# Patient Record
Sex: FEMALE | Race: WHITE | Employment: UNEMPLOYED | URBAN - NONMETROPOLITAN AREA
[De-identification: names, ages, dates, MRNs, and addresses within clinical notes are randomized per-mention and may not be internally consistent; named-entity substitution may affect disease eponyms.]

---

## 2020-03-24 ENCOUNTER — HOSPITAL ENCOUNTER (OUTPATIENT)
Age: 65
Setting detail: SPECIMEN
Discharge: HOME | End: 2020-03-24
Payer: COMMERCIAL

## 2020-03-24 LAB
ANION GAP SERPL CALCULATED.3IONS-SCNC: 14 MMOL/L (ref 9–17)
BUN BLDV-MCNC: 18 MG/DL (ref 8–23)
BUN/CREAT BLD: 56 (ref 9–20)
CALCIUM SERPL-MCNC: 9.6 MG/DL (ref 8.6–10.4)
CHLORIDE BLD-SCNC: 95 MMOL/L (ref 98–107)
CO2: 26 MMOL/L (ref 20–31)
CREAT SERPL-MCNC: 0.32 MG/DL (ref 0.5–0.9)
GFR AFRICAN AMERICAN: >60 ML/MIN
GFR NON-AFRICAN AMERICAN: >60 ML/MIN
GFR SERPL CREATININE-BSD FRML MDRD: ABNORMAL ML/MIN/{1.73_M2}
GFR SERPL CREATININE-BSD FRML MDRD: ABNORMAL ML/MIN/{1.73_M2}
GLUCOSE BLD-MCNC: 84 MG/DL (ref 70–99)
HCT VFR BLD CALC: 35.8 % (ref 36.3–47.1)
HEMOGLOBIN: 10.4 G/DL (ref 11.9–15.1)
MCH RBC QN AUTO: 25.3 PG (ref 25.2–33.5)
MCHC RBC AUTO-ENTMCNC: 29.1 G/DL (ref 28.4–34.8)
MCV RBC AUTO: 87.1 FL (ref 82.6–102.9)
NRBC AUTOMATED: 0 PER 100 WBC
PDW BLD-RTO: 14.4 % (ref 11.8–14.4)
PLATELET # BLD: 358 K/UL (ref 138–453)
PMV BLD AUTO: 9.5 FL (ref 8.1–13.5)
POTASSIUM SERPL-SCNC: 4.1 MMOL/L (ref 3.7–5.3)
RBC # BLD: 4.11 M/UL (ref 3.95–5.11)
SODIUM BLD-SCNC: 135 MMOL/L (ref 135–144)
WBC # BLD: 9.1 K/UL (ref 3.5–11.3)

## 2020-03-24 PROCEDURE — P9604 ONE-WAY ALLOW PRORATED TRIP: HCPCS

## 2020-03-24 PROCEDURE — 85027 COMPLETE CBC AUTOMATED: CPT

## 2020-03-24 PROCEDURE — 36415 COLL VENOUS BLD VENIPUNCTURE: CPT

## 2020-03-24 PROCEDURE — 80048 BASIC METABOLIC PNL TOTAL CA: CPT

## 2023-03-03 LAB
NON-UH HIE A/G RATIO: 1.2
NON-UH HIE ALB: 4 G/DL (ref 3.4–5)
NON-UH HIE ALK PHOS: 62 UNIT/L (ref 46–116)
NON-UH HIE BILIRUBIN, TOTAL: 0.7 MG/DL (ref 0.2–1)
NON-UH HIE BUN/CREAT RATIO: 30
NON-UH HIE BUN: 24 MG/DL (ref 9–23)
NON-UH HIE CALCIUM: 9.5 MG/DL (ref 8.7–10.4)
NON-UH HIE CALCULATED LDL CHOLESTEROL: 120 MG/DL (ref 60–130)
NON-UH HIE CALCULATED OSMOLALITY: 285 MOSM/KG (ref 275–295)
NON-UH HIE CHLORIDE: 104 MMOL/L (ref 98–107)
NON-UH HIE CHOLESTEROL: 197 MG/DL (ref 100–200)
NON-UH HIE CO2, VENOUS: 30 MMOL/L (ref 20–31)
NON-UH HIE CREATININE: 0.8 MG/DL (ref 0.5–0.8)
NON-UH HIE GFR AA: >60
NON-UH HIE GLOBULIN: 3.4 G/DL
NON-UH HIE GLOMERULAR FILTRATION RATE: >60 ML/MIN/1.73M?
NON-UH HIE GLUCOSE: 95 MG/DL (ref 74–106)
NON-UH HIE GOT: 19 UNIT/L (ref 15–37)
NON-UH HIE GPT: 28 UNIT/L (ref 10–49)
NON-UH HIE HDL CHOLESTEROL: 52 MG/DL (ref 40–60)
NON-UH HIE HGB A1C: 5.2 %
NON-UH HIE K: 4 MMOL/L (ref 3.5–5.1)
NON-UH HIE NA: 141 MMOL/L (ref 135–145)
NON-UH HIE TOTAL CHOL/HDL CHOL RATIO: 3.8
NON-UH HIE TOTAL PROTEIN: 7.4 G/DL (ref 5.7–8.2)
NON-UH HIE TRIGLYCERIDES: 126 MG/DL (ref 30–150)

## 2023-03-10 ENCOUNTER — TELEPHONE (OUTPATIENT)
Dept: PRIMARY CARE | Facility: CLINIC | Age: 68
End: 2023-03-10
Payer: COMMERCIAL

## 2023-03-10 DIAGNOSIS — Z12.31 ENCOUNTER FOR SCREENING MAMMOGRAM FOR BREAST CANCER: Primary | ICD-10-CM

## 2023-03-16 ENCOUNTER — TELEPHONE (OUTPATIENT)
Dept: PRIMARY CARE | Facility: CLINIC | Age: 68
End: 2023-03-16

## 2023-07-12 ENCOUNTER — APPOINTMENT (OUTPATIENT)
Dept: PRIMARY CARE | Facility: CLINIC | Age: 68
End: 2023-07-12
Payer: COMMERCIAL

## 2023-09-05 ENCOUNTER — APPOINTMENT (OUTPATIENT)
Dept: PRIMARY CARE | Facility: CLINIC | Age: 68
End: 2023-09-05
Payer: MEDICARE

## 2023-09-18 ENCOUNTER — OFFICE VISIT (OUTPATIENT)
Dept: PRIMARY CARE | Facility: CLINIC | Age: 68
End: 2023-09-18
Payer: MEDICARE

## 2023-09-18 VITALS
WEIGHT: 159 LBS | HEIGHT: 65 IN | BODY MASS INDEX: 26.49 KG/M2 | SYSTOLIC BLOOD PRESSURE: 124 MMHG | DIASTOLIC BLOOD PRESSURE: 78 MMHG | HEART RATE: 92 BPM | TEMPERATURE: 97.5 F

## 2023-09-18 DIAGNOSIS — Z85.820 PERSONAL HISTORY OF MALIGNANT MELANOMA OF SKIN: ICD-10-CM

## 2023-09-18 DIAGNOSIS — G62.9 NEUROPATHY: ICD-10-CM

## 2023-09-18 DIAGNOSIS — I10 BENIGN ESSENTIAL HYPERTENSION: ICD-10-CM

## 2023-09-18 DIAGNOSIS — Z78.0 POSTMENOPAUSAL STATE: ICD-10-CM

## 2023-09-18 DIAGNOSIS — Z12.31 ENCOUNTER FOR SCREENING MAMMOGRAM FOR BREAST CANCER: ICD-10-CM

## 2023-09-18 DIAGNOSIS — Z11.59 NEED FOR HEPATITIS C SCREENING TEST: ICD-10-CM

## 2023-09-18 DIAGNOSIS — R73.03 PREDIABETES: ICD-10-CM

## 2023-09-18 DIAGNOSIS — Z00.00 MEDICARE ANNUAL WELLNESS VISIT, SUBSEQUENT: Primary | ICD-10-CM

## 2023-09-18 DIAGNOSIS — C43.71 MALIGNANT MELANOMA OF RIGHT LOWER LEG (MULTI): ICD-10-CM

## 2023-09-18 DIAGNOSIS — E78.01 FAMILIAL HYPERCHOLESTEROLEMIA: ICD-10-CM

## 2023-09-18 PROBLEM — D18.01 HEMANGIOMA OF SKIN AND SUBCUTANEOUS TISSUE: Status: ACTIVE | Noted: 2023-06-28

## 2023-09-18 PROBLEM — D22.4 MELANOCYTIC NEVI OF SCALP AND NECK: Status: ACTIVE | Noted: 2023-06-28

## 2023-09-18 PROBLEM — L28.0 LICHEN SIMPLEX CHRONICUS: Status: ACTIVE | Noted: 2023-06-28

## 2023-09-18 PROBLEM — J30.9 ALLERGIC RHINITIS: Status: ACTIVE | Noted: 2023-09-18

## 2023-09-18 PROBLEM — E53.8 VITAMIN B12 DEFICIENCY: Status: ACTIVE | Noted: 2023-09-18

## 2023-09-18 PROBLEM — D22.5 MELANOCYTIC NEVI OF TRUNK: Status: ACTIVE | Noted: 2023-06-28

## 2023-09-18 PROBLEM — M54.50 LUMBAR BACK PAIN: Status: ACTIVE | Noted: 2023-09-18

## 2023-09-18 PROBLEM — G57.91 NEUROPATHY OF RIGHT LOWER EXTREMITY: Status: ACTIVE | Noted: 2023-09-18

## 2023-09-18 PROBLEM — D64.9 ANEMIA: Status: ACTIVE | Noted: 2023-09-18

## 2023-09-18 PROBLEM — E78.5 HYPERLIPIDEMIA: Status: ACTIVE | Noted: 2023-09-18

## 2023-09-18 PROBLEM — M99.03 SEGMENTAL AND SOMATIC DYSFUNCTION OF LUMBAR REGION: Status: ACTIVE | Noted: 2023-09-18

## 2023-09-18 PROBLEM — C44.519 BASAL CELL CARCINOMA OF SKIN OF OTHER PART OF TRUNK: Status: ACTIVE | Noted: 2023-06-28

## 2023-09-18 PROBLEM — L57.0 ACTINIC KERATOSIS: Status: ACTIVE | Noted: 2023-06-28

## 2023-09-18 PROBLEM — K76.0 FATTY LIVER: Status: ACTIVE | Noted: 2023-09-18

## 2023-09-18 PROBLEM — E55.9 VITAMIN D DEFICIENCY: Status: ACTIVE | Noted: 2023-09-18

## 2023-09-18 PROBLEM — L91.0 KELOID: Status: ACTIVE | Noted: 2023-06-28

## 2023-09-18 PROBLEM — U07.1 COVID-19: Status: RESOLVED | Noted: 2023-09-18 | Resolved: 2023-09-18

## 2023-09-18 PROBLEM — D22.62 MELANOCYTIC NEVI OF LEFT UPPER LIMB, INCLUDING SHOULDER: Status: ACTIVE | Noted: 2023-06-28

## 2023-09-18 PROBLEM — M54.12 CERVICAL RADICULOPATHY, ACUTE: Status: ACTIVE | Noted: 2023-09-18

## 2023-09-18 PROBLEM — R92.8 ABNORMAL MAMMOGRAM: Status: ACTIVE | Noted: 2023-09-18

## 2023-09-18 PROBLEM — R07.9 CHEST PAIN: Status: ACTIVE | Noted: 2023-09-18

## 2023-09-18 PROCEDURE — 3078F DIAST BP <80 MM HG: CPT | Performed by: FAMILY MEDICINE

## 2023-09-18 PROCEDURE — 1160F RVW MEDS BY RX/DR IN RCRD: CPT | Performed by: FAMILY MEDICINE

## 2023-09-18 PROCEDURE — 99213 OFFICE O/P EST LOW 20 MIN: CPT | Performed by: FAMILY MEDICINE

## 2023-09-18 PROCEDURE — 1170F FXNL STATUS ASSESSED: CPT | Performed by: FAMILY MEDICINE

## 2023-09-18 PROCEDURE — 1126F AMNT PAIN NOTED NONE PRSNT: CPT | Performed by: FAMILY MEDICINE

## 2023-09-18 PROCEDURE — 1036F TOBACCO NON-USER: CPT | Performed by: FAMILY MEDICINE

## 2023-09-18 PROCEDURE — 1159F MED LIST DOCD IN RCRD: CPT | Performed by: FAMILY MEDICINE

## 2023-09-18 PROCEDURE — G0439 PPPS, SUBSEQ VISIT: HCPCS | Performed by: FAMILY MEDICINE

## 2023-09-18 PROCEDURE — 3074F SYST BP LT 130 MM HG: CPT | Performed by: FAMILY MEDICINE

## 2023-09-18 RX ORDER — ATORVASTATIN CALCIUM 40 MG/1
40 TABLET, FILM COATED ORAL DAILY
COMMUNITY
Start: 2023-06-17 | End: 2024-03-19 | Stop reason: SDUPTHER

## 2023-09-18 RX ORDER — AMLODIPINE BESYLATE 5 MG/1
5 TABLET ORAL DAILY
COMMUNITY
End: 2024-03-19 | Stop reason: SDUPTHER

## 2023-09-18 RX ORDER — HYDROCHLOROTHIAZIDE 50 MG/1
50 TABLET ORAL DAILY
COMMUNITY
End: 2024-03-19 | Stop reason: SDUPTHER

## 2023-09-18 RX ORDER — POTASSIUM CHLORIDE 750 MG/1
CAPSULE, EXTENDED RELEASE ORAL EVERY 12 HOURS
COMMUNITY
End: 2024-03-19 | Stop reason: SDUPTHER

## 2023-09-18 ASSESSMENT — ACTIVITIES OF DAILY LIVING (ADL)
TAKING_MEDICATION: INDEPENDENT
DRESSING: INDEPENDENT
GROCERY_SHOPPING: INDEPENDENT
DOING_HOUSEWORK: INDEPENDENT
MANAGING_FINANCES: INDEPENDENT
BATHING: INDEPENDENT

## 2023-09-18 ASSESSMENT — PATIENT HEALTH QUESTIONNAIRE - PHQ9
1. LITTLE INTEREST OR PLEASURE IN DOING THINGS: NOT AT ALL
1. LITTLE INTEREST OR PLEASURE IN DOING THINGS: NOT AT ALL
SUM OF ALL RESPONSES TO PHQ9 QUESTIONS 1 AND 2: 0
2. FEELING DOWN, DEPRESSED OR HOPELESS: NOT AT ALL
2. FEELING DOWN, DEPRESSED OR HOPELESS: NOT AT ALL
SUM OF ALL RESPONSES TO PHQ9 QUESTIONS 1 AND 2: 0

## 2023-09-18 NOTE — PROGRESS NOTES
Subjective   Reason for Visit: Zakiya Barrow is an 68 y.o. female here for a Medicare Wellness visit.     Past Medical, Surgical, and Family History reviewed and updated in chart.    Reviewed all medications by prescribing practitioner or clinical pharmacist (such as prescriptions, OTCs, herbal therapies and supplements) and documented in the medical record.    Very pleasant 68-year-old here today for follow-up chronic conditions  Hypertension and hyperlipidemia  Had been exercising regularly very active and social  Also here for annual Medicare wellness exam  No falls or accidents patient is very functional  Tolerating meds well with no problems side effects or issues has had these chronic conditions for quite some time and they have been well controlled she is done extremely well with lifestyle modification  Has history of malignant melanoma had resection and treatment has been having some peripheral neuropathy since the treatment and that seems to be getting a bit worse and starting to impair her otherwise healthy wife no injury to her back has some persistent paresthesias and weakness in the leg no incontinence no falls no foot drop  Has been persistent for most of the last year    Hyperlipidemia  This is a chronic problem. The current episode started more than 1 year ago. The problem is controlled. She has no history of chronic renal disease, diabetes, hypothyroidism, liver disease, obesity or nephrotic syndrome. There are no known factors aggravating her hyperlipidemia. Pertinent negatives include no chest pain, focal sensory loss, focal weakness, leg pain, myalgias or shortness of breath. Current antihyperlipidemic treatment includes exercise, diet change and statins. The current treatment provides significant improvement of lipids. There are no compliance problems.  Risk factors for coronary artery disease include dyslipidemia, family history, hypertension and post-menopausal.   Hypertension  This is a  chronic problem. The current episode started more than 1 year ago. The problem is controlled. Pertinent negatives include no anxiety, blurred vision, chest pain, headaches, malaise/fatigue, neck pain, orthopnea, palpitations, peripheral edema, PND, shortness of breath or sweats. There are no associated agents to hypertension. Risk factors for coronary artery disease include family history, dyslipidemia and post-menopausal state. Past treatments include diuretics and calcium channel blockers. The current treatment provides significant improvement. There are no compliance problems.  There is no history of angina, kidney disease, CAD/MI, CVA, heart failure, left ventricular hypertrophy, PVD or retinopathy. There is no history of chronic renal disease, coarctation of the aorta, hyperaldosteronism, hypercortisolism, hyperparathyroidism, a hypertension causing med, pheochromocytoma, renovascular disease, sleep apnea or a thyroid problem.       Patient Self Assessment of Health Status  Patient Self Assessment: Good    Nutrition and Exercise  Current Diet: Well Balanced Diet  Adequate Fluid Intake: Yes  Caffeine: No  Exercise Frequency: Regularly    Functional Ability/Level of Safety  Cognitive Impairment Observed: No cognitive impairment observed  Cognitive Impairment Reported: No cognitive impairment reported by patient or family    Home Safety Risk Factors: None         Patient Care Team:  Miryam Lozoya MD as PCP - General  Miryam Lozoya MD as PCP - [facility]     Review of Systems   Constitutional:  Negative for malaise/fatigue.   Eyes:  Negative for blurred vision.   Respiratory:  Negative for shortness of breath.    Cardiovascular:  Negative for chest pain, palpitations, orthopnea and PND.   Musculoskeletal:  Negative for myalgias and neck pain.   Neurological:  Negative for focal weakness and headaches.     Constitutional: no chills, no fever and no night sweats.   Eyes: no blurred vision and  "no eyesight problems.   ENT: no hearing loss, no nasal congestion, no nasal discharge, no hoarseness and no sore throat.   Cardiovascular: no chest pain, no intermittent leg claudication, no lower extremity edema, no palpitations and no syncope.   Respiratory: no cough, no shortness of breath during exertion, no shortness of breath at rest and no wheezing.   Gastrointestinal: no abdominal pain, no blood in stools, no constipation, no diarrhea, no melena, no nausea, no rectal pain and no vomiting.   Genitourinary: no dysuria, no change in urinary frequency, no urinary hesitancy, no feelings of urinary urgency and no vaginal discharge.   Musculoskeletal: no arthralgias,  no back pain and no myalgias.   Integumentary: no new skin lesions and no rashes.   Neurological: no difficulty walking, no headache, no limb weakness, no numbness and no tingling.   Psychiatric: no anxiety, no depression, no anhedonia and no substance use disorders.   Endocrine: no recent weight gain and no recent weight loss.   Hematologic/Lymphatic: no tendency for easy bruising and no swollen glands .    Medicare Wellness Visit Billing Compliance Not Met    *This is a visual tool to show completion of required items on the day of the visit. Green checks will only appear on the date of visit.      Objective   Vitals:  /78   Pulse 92   Temp 36.4 °C (97.5 °F)   Ht 1.651 m (5' 5\")   Wt 72.1 kg (159 lb)   BMI 26.46 kg/m²       Physical Exam  The patient appeared well nourished and normally developed. Vital signs as documented. Head exam is unremarkable. No scleral icterus or corneal arcus noted.  Pupils are equal round reactive to light extraocular movements are intact no hemorrhages noted on funduscopic exam mouth mucous membranes are moist no exudates ears canals clear TMs are gray pearly not injected nose no rhinorrhea or epistaxis Neck is without jugular venous distension, thyromegaly, or carotid bruits. Carotid upstrokes are brisk " bilaterally. Lungs are clear to auscultation and percussion. Cardiac exam reveals the PMI to be normally sized and situated. Rhythm is regular. First and second heart sounds normal. No murmurs, rubs or gallops. Abdominal exam reveals normal bowel sounds, no masses, no organomegaly and no aortic enlargement. Extremities are nonedematous and both femoral and pedal pulses are normal.  Neurologic exam DTRs are equal bilaterally no focal deficits strength is symmetrical heme lymph no palpable lymph nodes in the neck axilla or groin    Assessment/Plan   Problem List Items Addressed This Visit       Prediabetes    Relevant Orders    Hemoglobin A1C    Medicare annual wellness visit, subsequent - Primary    Current Assessment & Plan     Continue annual exams  Flu shot recommended         Personal history of malignant melanoma of skin    Current Assessment & Plan     Stable no sign of return of disease         Benign essential hypertension    Current Assessment & Plan     Stable and controlled based on symptoms and exam  Continue medication         Relevant Orders    Comprehensive Metabolic Panel    Hyperlipidemia    Current Assessment & Plan     Stable based on symptoms and exam  No issues  Check lipid level         Relevant Orders    Lipid Panel    RESOLVED: Malignant melanoma of right lower leg (CMS/HCC)    Neuropathy    Current Assessment & Plan     Peripheral neuropathy of right lower extremity  Past history of melanoma  EMG to evaluate the nerve damage         Relevant Orders    EMG & nerve conduction     Other Visit Diagnoses       Encounter for screening mammogram for breast cancer        Relevant Orders    BI mammo bilateral screening tomosynthesis    Postmenopausal state        Relevant Orders    XR DEXA bone density    Need for hepatitis C screening test        Relevant Orders    Hepatitis C antibody

## 2023-09-30 PROBLEM — G62.9 NEUROPATHY: Status: ACTIVE | Noted: 2023-09-30

## 2023-09-30 PROBLEM — G57.91 NEUROPATHY OF RIGHT LOWER EXTREMITY: Status: RESOLVED | Noted: 2023-09-18 | Resolved: 2023-09-30

## 2023-09-30 PROBLEM — C44.519 BASAL CELL CARCINOMA OF SKIN OF OTHER PART OF TRUNK: Status: RESOLVED | Noted: 2023-06-28 | Resolved: 2023-09-30

## 2023-09-30 PROBLEM — R07.9 CHEST PAIN: Status: RESOLVED | Noted: 2023-09-18 | Resolved: 2023-09-30

## 2023-09-30 PROBLEM — C43.71 MALIGNANT MELANOMA OF RIGHT LOWER LEG (MULTI): Status: RESOLVED | Noted: 2023-06-28 | Resolved: 2023-09-30

## 2023-09-30 ASSESSMENT — ENCOUNTER SYMPTOMS
FOCAL SENSORY LOSS: 0
HEADACHES: 0
SHORTNESS OF BREATH: 0
NECK PAIN: 0
BLURRED VISION: 0
LEG PAIN: 0
PALPITATIONS: 0
SWEATS: 0
ORTHOPNEA: 0
FOCAL WEAKNESS: 0
HYPERTENSION: 1
PND: 0
MYALGIAS: 0

## 2023-09-30 NOTE — PATIENT INSTRUCTIONS
Today you were seen for follow-up of multiple issues especially the persistent neuropathy particularly in your lower extremity and your chronic conditions hyperlipidemia and hypertension and annual Medicare wellness  I am ordering an EMG nerve conduction study to evaluate the scope of the neuropathy and see and get an idea if this can be treated

## 2023-09-30 NOTE — ASSESSMENT & PLAN NOTE
Peripheral neuropathy of right lower extremity  Past history of melanoma  EMG to evaluate the nerve damage

## 2023-10-17 ENCOUNTER — HOSPITAL ENCOUNTER (OUTPATIENT)
Dept: NEUROLOGY | Facility: HOSPITAL | Age: 68
Discharge: HOME | End: 2023-10-17
Payer: MEDICARE

## 2023-10-17 DIAGNOSIS — G62.9 NEUROPATHY: ICD-10-CM

## 2023-10-17 PROCEDURE — 95912 NRV CNDJ TEST 11-12 STUDIES: CPT | Performed by: PSYCHIATRY & NEUROLOGY

## 2023-10-17 PROCEDURE — 95886 MUSC TEST DONE W/N TEST COMP: CPT | Performed by: PSYCHIATRY & NEUROLOGY

## 2023-10-23 ENCOUNTER — ANCILLARY PROCEDURE (OUTPATIENT)
Dept: RADIOLOGY | Facility: CLINIC | Age: 68
End: 2023-10-23
Payer: MEDICARE

## 2023-10-23 DIAGNOSIS — Z78.0 POSTMENOPAUSAL STATE: ICD-10-CM

## 2023-10-23 PROCEDURE — 77080 DXA BONE DENSITY AXIAL: CPT | Performed by: STUDENT IN AN ORGANIZED HEALTH CARE EDUCATION/TRAINING PROGRAM

## 2023-10-23 PROCEDURE — 77080 DXA BONE DENSITY AXIAL: CPT

## 2023-10-24 ENCOUNTER — LAB (OUTPATIENT)
Dept: LAB | Facility: LAB | Age: 68
End: 2023-10-24
Payer: MEDICARE

## 2023-10-24 DIAGNOSIS — E78.01 FAMILIAL HYPERCHOLESTEROLEMIA: ICD-10-CM

## 2023-10-24 DIAGNOSIS — Z11.59 NEED FOR HEPATITIS C SCREENING TEST: ICD-10-CM

## 2023-10-24 DIAGNOSIS — R73.03 PREDIABETES: ICD-10-CM

## 2023-10-24 DIAGNOSIS — I10 BENIGN ESSENTIAL HYPERTENSION: ICD-10-CM

## 2023-10-24 LAB
ALBUMIN SERPL BCP-MCNC: 4.4 G/DL (ref 3.4–5)
ALP SERPL-CCNC: 59 U/L (ref 33–136)
ALT SERPL W P-5'-P-CCNC: 16 U/L (ref 7–45)
ANION GAP SERPL CALC-SCNC: 16 MMOL/L (ref 10–20)
AST SERPL W P-5'-P-CCNC: 12 U/L (ref 9–39)
BILIRUB SERPL-MCNC: 0.6 MG/DL (ref 0–1.2)
BUN SERPL-MCNC: 22 MG/DL (ref 6–23)
CALCIUM SERPL-MCNC: 9.6 MG/DL (ref 8.6–10.6)
CHLORIDE SERPL-SCNC: 103 MMOL/L (ref 98–107)
CHOLEST SERPL-MCNC: 178 MG/DL (ref 0–199)
CHOLESTEROL/HDL RATIO: 2.7
CO2 SERPL-SCNC: 28 MMOL/L (ref 21–32)
CREAT SERPL-MCNC: 0.72 MG/DL (ref 0.5–1.05)
EST. AVERAGE GLUCOSE BLD GHB EST-MCNC: 114 MG/DL
GFR SERPL CREATININE-BSD FRML MDRD: >90 ML/MIN/1.73M*2
GLUCOSE SERPL-MCNC: 109 MG/DL (ref 74–99)
HBA1C MFR BLD: 5.6 %
HCV AB SER QL: NONREACTIVE
HDLC SERPL-MCNC: 65.7 MG/DL
LDLC SERPL CALC-MCNC: 96 MG/DL
NON HDL CHOLESTEROL: 112 MG/DL (ref 0–149)
POTASSIUM SERPL-SCNC: 3.8 MMOL/L (ref 3.5–5.3)
PROT SERPL-MCNC: 7.3 G/DL (ref 6.4–8.2)
SODIUM SERPL-SCNC: 143 MMOL/L (ref 136–145)
TRIGL SERPL-MCNC: 81 MG/DL (ref 0–149)
VLDL: 16 MG/DL (ref 0–40)

## 2023-10-24 PROCEDURE — 83036 HEMOGLOBIN GLYCOSYLATED A1C: CPT

## 2023-10-24 PROCEDURE — 86803 HEPATITIS C AB TEST: CPT

## 2023-10-24 PROCEDURE — 80053 COMPREHEN METABOLIC PANEL: CPT

## 2023-10-24 PROCEDURE — 80061 LIPID PANEL: CPT

## 2023-10-24 PROCEDURE — 36415 COLL VENOUS BLD VENIPUNCTURE: CPT

## 2023-11-01 ENCOUNTER — OFFICE VISIT (OUTPATIENT)
Dept: PRIMARY CARE | Facility: CLINIC | Age: 68
End: 2023-11-01
Payer: MEDICARE

## 2023-11-01 VITALS
WEIGHT: 155 LBS | BODY MASS INDEX: 25.83 KG/M2 | SYSTOLIC BLOOD PRESSURE: 128 MMHG | HEART RATE: 68 BPM | TEMPERATURE: 98.6 F | DIASTOLIC BLOOD PRESSURE: 78 MMHG | HEIGHT: 65 IN

## 2023-11-01 DIAGNOSIS — E78.2 MIXED HYPERLIPIDEMIA: ICD-10-CM

## 2023-11-01 DIAGNOSIS — R73.03 PREDIABETES: ICD-10-CM

## 2023-11-01 DIAGNOSIS — K76.0 FATTY LIVER: ICD-10-CM

## 2023-11-01 DIAGNOSIS — Z12.11 COLON CANCER SCREENING: Primary | ICD-10-CM

## 2023-11-01 DIAGNOSIS — I10 BENIGN ESSENTIAL HYPERTENSION: ICD-10-CM

## 2023-11-01 DIAGNOSIS — G62.9 NEUROPATHY: ICD-10-CM

## 2023-11-01 PROCEDURE — 3078F DIAST BP <80 MM HG: CPT | Performed by: FAMILY MEDICINE

## 2023-11-01 PROCEDURE — 1036F TOBACCO NON-USER: CPT | Performed by: FAMILY MEDICINE

## 2023-11-01 PROCEDURE — 1126F AMNT PAIN NOTED NONE PRSNT: CPT | Performed by: FAMILY MEDICINE

## 2023-11-01 PROCEDURE — 1160F RVW MEDS BY RX/DR IN RCRD: CPT | Performed by: FAMILY MEDICINE

## 2023-11-01 PROCEDURE — 99213 OFFICE O/P EST LOW 20 MIN: CPT | Performed by: FAMILY MEDICINE

## 2023-11-01 PROCEDURE — 3074F SYST BP LT 130 MM HG: CPT | Performed by: FAMILY MEDICINE

## 2023-11-01 PROCEDURE — 1159F MED LIST DOCD IN RCRD: CPT | Performed by: FAMILY MEDICINE

## 2023-11-01 NOTE — PROGRESS NOTES
Subjective   Patient ID: Zakiya Barrow is a 68 y.o. female who presents for Follow-up.  Very pleasant 68-year-old follow-up recent physical she would like to discuss the EMG results for the weakness in her extremity and her blood work  Has history of hyperlipidemia has been stable on a statin  Laboratory evaluation revealed that she had a well-controlled lipid panel  She denies any body aches or fatigue from the medication  Her EMG shows no evidence of advancing neuropathy but does show some nerve damage from the site of her previous melanoma excision as expected  She has been noting some numbness and weakness in that area  It has not been terribly disruptive  She was concerned that she may have been having some radiating neuropathy process from her lower back which is not the case we discussed the EMG results        Review of Systems  Constitutional: no chills, no fever and no night sweats.   Eyes: no blurred vision and no eyesight problems.   ENT: no hearing loss, no nasal congestion, no nasal discharge, no hoarseness and no sore throat.   Cardiovascular: no chest pain, no intermittent leg claudication, no lower extremity edema, no palpitations and no syncope.   Respiratory: no cough, no shortness of breath during exertion, no shortness of breath at rest and no wheezing.   Gastrointestinal: no abdominal pain, no blood in stools, no constipation, no diarrhea, no melena, no nausea, no rectal pain and no vomiting.   Genitourinary: no dysuria, no change in urinary frequency, no urinary hesitancy, no feelings of urinary urgency and no vaginal discharge.   Musculoskeletal: no arthralgias,  no back pain and no myalgias.   Integumentary: no new skin lesions and no rashes.   Neurological: no difficulty walking, no headache, no limb weakness, no numbness and no tingling.   Psychiatric: no anxiety, no depression, no anhedonia and no substance use disorders.   Endocrine: no recent weight gain and no recent weight loss.  "  Hematologic/Lymphatic: no tendency for easy bruising and no swollen glands .    Objective    /78   Pulse 68   Temp 37 °C (98.6 °F)   Ht 1.651 m (5' 5\")   Wt 70.3 kg (155 lb)   BMI 25.79 kg/m²    Physical Exam  The patient appeared well nourished and normally developed. Vital signs as documented. Head exam is unremarkable. No scleral icterus or corneal arcus noted.  Pupils are equal round reactive to light extraocular movements are intact no hemorrhages noted on funduscopic exam mouth mucous membranes are moist no exudates ears canals clear TMs are gray pearly not injected nose no rhinorrhea or epistaxis Neck is without jugular venous distension, thyromegaly, or carotid bruits. Carotid upstrokes are brisk bilaterally. Lungs are clear to auscultation and percussion. Cardiac exam reveals the PMI to be normally sized and situated. Rhythm is regular. First and second heart sounds normal. No murmurs, rubs or gallops. Abdominal exam reveals normal bowel sounds, no masses, no organomegaly and no aortic enlargement. Extremities are nonedematous and both femoral and pedal pulses are normal.  Neurologic exam DTRs are equal bilaterally no focal deficits strength is symmetrical heme lymph no palpable lymph nodes in the neck axilla or groin    Assessment/Plan   Problem List Items Addressed This Visit       Prediabetes     Stable based on symptoms and exam  Continue to monitor hemoglobin A1c         Benign essential hypertension     Blood pressure very good  Potassium normal hypokalemia resolved  Continue to monitor yearly         Fatty liver     Improving  Liver enzymes normal  Continue to monitor yearly         Hyperlipidemia     Improved  Continue statin and low-cholesterol diet  Stable based on symptoms and exam  Check cholesterol at least twice a year  Liver enzymes stable         Neuropathy     EMG is consistent with neuropathy from nerve damage from the melanoma excision  Explained that this is most likely " permanent  Continue strengthening exercises  No evidence of diabetic neuropathy or peripheral neuropathy            Other Visit Diagnoses       Colon cancer screening    -  Primary    Relevant Orders    Colonoscopy Screening; Average Risk Patient                 Miryam Lozoya MD

## 2023-11-08 ENCOUNTER — OFFICE VISIT (OUTPATIENT)
Dept: ENDOCRINOLOGY | Facility: CLINIC | Age: 68
End: 2023-11-08
Payer: MEDICARE

## 2023-11-08 VITALS — WEIGHT: 157 LBS | BODY MASS INDEX: 26.13 KG/M2 | SYSTOLIC BLOOD PRESSURE: 122 MMHG | DIASTOLIC BLOOD PRESSURE: 74 MMHG

## 2023-11-08 DIAGNOSIS — E78.5 HYPERLIPIDEMIA, UNSPECIFIED HYPERLIPIDEMIA TYPE: ICD-10-CM

## 2023-11-08 DIAGNOSIS — I10 BENIGN ESSENTIAL HYPERTENSION: ICD-10-CM

## 2023-11-08 DIAGNOSIS — R73.03 PREDIABETES: Primary | ICD-10-CM

## 2023-11-08 PROCEDURE — 3074F SYST BP LT 130 MM HG: CPT | Performed by: INTERNAL MEDICINE

## 2023-11-08 PROCEDURE — 1036F TOBACCO NON-USER: CPT | Performed by: INTERNAL MEDICINE

## 2023-11-08 PROCEDURE — 1160F RVW MEDS BY RX/DR IN RCRD: CPT | Performed by: INTERNAL MEDICINE

## 2023-11-08 PROCEDURE — 1159F MED LIST DOCD IN RCRD: CPT | Performed by: INTERNAL MEDICINE

## 2023-11-08 PROCEDURE — 3078F DIAST BP <80 MM HG: CPT | Performed by: INTERNAL MEDICINE

## 2023-11-08 PROCEDURE — 1126F AMNT PAIN NOTED NONE PRSNT: CPT | Performed by: INTERNAL MEDICINE

## 2023-11-08 PROCEDURE — 99214 OFFICE O/P EST MOD 30 MIN: CPT | Performed by: INTERNAL MEDICINE

## 2023-11-08 NOTE — PROGRESS NOTES
Patient ID: Zakiya Barrow is a 68 y.o. female who presents for Follow-up.  HPI  The patient comes in for follow up.    She has been on and off track of [facility] for management of insulin resistance with prediabetes hyperlipidemia hypertension fatigue GERD and lymphedema.    Since last being seen she was cleared by cardiology is now on different medication for blood pressure.    She had an upper respiratory infection and had 2 courses of steroids.    She is back to working with the nutritionist and is looking to get back on track.    Physically she has no complaints.    ROS  Comprehensive review of systems is negative.    Objective   Physical Exam  Visit Vitals  /74      Vitals:    11/08/23 1004   Weight: 71.2 kg (157 lb)      Body mass index is 26.13 kg/m².      Weight 157 up 2 pounds    Eyes normal  ENT normal. No adenopathy  Thyroid palpable and normal. No nodules  Chest clear to auscultation  Heart sounds are normal  Abdomen nontender. Bowel sounds normal. No organomegaly  Feet are okay    Assessment/Plan     1.  Insulin resistance with prediabetes  2.  Hypertension  3.  Hyperlipidemia  4.  GERD    She is going to work to get back on track.  We discussed strategies for success. Planning ahead, taking things a week at a time and planning the week ahead of time.    We will hold on blood work today.    We again discussed metformin as an option down the road.    We discussed the holiday strategies.    Follow up with the nutritionist in 1 month, me in 2 months, sooner as needed.

## 2023-11-19 PROBLEM — S32.010A CLOSED COMPRESSION FRACTURE OF BODY OF L1 VERTEBRA (MULTI): Status: RESOLVED | Noted: 2023-11-19 | Resolved: 2023-11-19

## 2023-11-19 PROBLEM — R07.81 RIB PAIN ON RIGHT SIDE: Status: RESOLVED | Noted: 2023-11-19 | Resolved: 2023-11-19

## 2023-11-19 PROBLEM — C43.9 MELANOMA (MULTI): Status: ACTIVE | Noted: 2023-11-19

## 2023-11-19 PROBLEM — M94.0 COSTOCHONDRITIS: Status: RESOLVED | Noted: 2023-11-19 | Resolved: 2023-11-19

## 2023-11-19 PROBLEM — G45.9 TIA (TRANSIENT ISCHEMIC ATTACK): Status: RESOLVED | Noted: 2023-11-19 | Resolved: 2023-11-19

## 2023-11-19 RX ORDER — PRAVASTATIN SODIUM 40 MG/1
40 TABLET ORAL NIGHTLY
COMMUNITY
Start: 2017-04-07 | End: 2024-03-19 | Stop reason: WASHOUT

## 2023-11-19 RX ORDER — TRIAMTERENE/HYDROCHLOROTHIAZID 37.5-25 MG
1 TABLET ORAL DAILY
COMMUNITY
Start: 2020-03-12 | End: 2024-03-19 | Stop reason: DRUGHIGH

## 2023-11-20 NOTE — ASSESSMENT & PLAN NOTE
Improved  Continue statin and low-cholesterol diet  Stable based on symptoms and exam  Check cholesterol at least twice a year  Liver enzymes stable

## 2023-11-20 NOTE — ASSESSMENT & PLAN NOTE
EMG is consistent with neuropathy from nerve damage from the melanoma excision  Explained that this is most likely permanent  Continue strengthening exercises  No evidence of diabetic neuropathy or peripheral neuropathy

## 2023-11-21 ENCOUNTER — TELEMEDICINE CLINICAL SUPPORT (OUTPATIENT)
Dept: ENDOCRINOLOGY | Facility: CLINIC | Age: 68
End: 2023-11-21
Payer: MEDICARE

## 2023-11-21 VITALS — WEIGHT: 155 LBS | BODY MASS INDEX: 25.79 KG/M2

## 2023-11-21 NOTE — PROGRESS NOTES
Patient Discussion/Summary:   Met with pt on 23 for MNT discussing the [facility] diet. Pt has been on/off program but is working towards getting back on track. Reports wt loss over the last month. Confirms ketosis using ketostix. Reviewed supplement use, protein needs, meal choices, combating candy dish cravings and planning ahead for the holidays. Pt will be hosting Agnes and plans to have small portions of side dishes while filling up on vegetables and turkey. Will send leftovers with guests to get back on track the next day.     Vitals:    23 0945   Weight: 70.3 kg (155 lb)     Protein sparing modified fasting education handout was provided to patient and reviewed at this time along with daily protein and fluid recommendations. Understands need to take supplements, measure foods and test ketones going forward. No concerns or challenge areas presented by pt at this time. Encouraged to follow diet as outlined for optimal results. Discussed meal examples and provided encouragement. Pt engaged and asked multiple questions during session.       Lab Results   Component Value Date    HGBA1C 5.6 10/24/2023      Potassium   Date Value Ref Range Status   10/24/2023 3.8 3.5 - 5.3 mmol/L Final     Magnesium   Date Value Ref Range Status   07/10/2023 1.76 1.60 - 2.40 mg/dL Final      Glucose   Date Value Ref Range Status   10/24/2023 109 (H) 74 - 99 mg/dL Final       Calculations:    IBW: 120lb/54.54kg  Protein need calculated (1.5g/kg IBW) = 82g  Protein intake recommended: 84g = 12oz      Daily Meal Plan:   Morning:    3oz protein          Afternoon:  4.5oz protein + 1 vegetable from the list   Evenin.5oz protein + 1 vegetable from the list   Fluids:        64-96oz per day     Recommended supplements:  Potassium as directed with breakfast and dinner (prescription)  Fiber capsules 1-2 times daily or sugar-free fiber powder (as tolerated)   Colace (stool softener) as needed, up to 4  capsules/day  Calcium: 500-600 mg, pill form twice daily with breakfast or lunch AND dinner  Multivitamin tablet or capsule per day that is low in iron (i.e. Centrum Silver)  Magnesium: 200-250 mg, pill form with dinner or at bedtime  Bouillon cube one to two times per day as needed, if feeling dizzy   Beginning with the fourth morning of this diet, test urine every morning using Ketostix for ketone (fat) breakdown.

## 2023-11-29 ENCOUNTER — OFFICE VISIT (OUTPATIENT)
Dept: DERMATOLOGY | Facility: CLINIC | Age: 68
End: 2023-11-29
Payer: MEDICARE

## 2023-11-29 DIAGNOSIS — Z85.820 PERSONAL HISTORY OF MALIGNANT MELANOMA OF SKIN: Primary | ICD-10-CM

## 2023-11-29 DIAGNOSIS — D22.9 MULTIPLE BENIGN MELANOCYTIC NEVI: ICD-10-CM

## 2023-11-29 DIAGNOSIS — L81.4 LENTIGO: ICD-10-CM

## 2023-11-29 DIAGNOSIS — Z85.828 PERSONAL HISTORY OF OTHER MALIGNANT NEOPLASM OF SKIN: ICD-10-CM

## 2023-11-29 DIAGNOSIS — L90.5 SCAR CONDITIONS AND FIBROSIS OF SKIN: ICD-10-CM

## 2023-11-29 DIAGNOSIS — Z12.83 ENCOUNTER FOR SCREENING FOR MALIGNANT NEOPLASM OF SKIN: ICD-10-CM

## 2023-11-29 DIAGNOSIS — L82.1 SEBORRHEIC KERATOSIS: ICD-10-CM

## 2023-11-29 PROCEDURE — 1036F TOBACCO NON-USER: CPT | Performed by: DERMATOLOGY

## 2023-11-29 PROCEDURE — 1126F AMNT PAIN NOTED NONE PRSNT: CPT | Performed by: DERMATOLOGY

## 2023-11-29 PROCEDURE — 1159F MED LIST DOCD IN RCRD: CPT | Performed by: DERMATOLOGY

## 2023-11-29 PROCEDURE — 1160F RVW MEDS BY RX/DR IN RCRD: CPT | Performed by: DERMATOLOGY

## 2023-11-29 PROCEDURE — 99213 OFFICE O/P EST LOW 20 MIN: CPT | Performed by: DERMATOLOGY

## 2023-11-29 NOTE — PROGRESS NOTES
Subjective     Zakiya Barrow is a 68 y.o. female who presents for the following: Skin Check.     Patient has not noticed new or changing lesions since last visit in September.    Review of Systems:  No other skin or systemic complaints other than what is documented elsewhere in the note.    The following portions of the chart were reviewed this encounter and updated as appropriate:       *Patient allergic to lidocaine. Can only use carbocaine (MEPIVACAINE or bupivacaine)     Skin Cancer History  - History of stage T2b melanoma of right calf s/p WLE and indeterminate SLNB 2017, followed as stage IIIA by surgical oncology with lymph node surveillance, cleared for follow up in April 2022     Lesion 1: Nodular Basal Cell Carcinoma.  Deep margins involved.  Year Diagnosed:  2019. November.  Location:  Rt upper chest  Treatment(s): excision 12/2019  Pathology: [id number]    Lesion 2: Superficial Basal Cell Carcinoma.  Deep margins involved.  Year Diagnosed:  2020.  May.  Location:  Medial Upper Chest.  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, atypia extending to peripheral margins  Year Diagnosed:  9/2022  Location:  dorsum of left hand  Treatment(s): re-scoop shave excision 11/1/22, AK on margins s/p LN2  Pathology: [id number]        Specialty Problems          Dermatology Problems    Actinic keratosis    Hemangioma of skin and subcutaneous tissue    Keloid    Lichen simplex chronicus    Melanocytic nevi of left upper limb, including shoulder    Melanocytic nevi of scalp and neck    Melanocytic nevi of trunk    Personal history of malignant melanoma of skin    Melanoma (CMS/HCC)        Objective   Well appearing patient in no apparent distress; mood and affect are within normal limits.    A full examination was performed including scalp, head, eyes, ears, nose, lips, neck, chest, axillae, abdomen, back,  buttocks, bilateral upper extremities, bilateral lower extremities, hands, feet, fingers, toes, fingernails, and toenails. All findings within normal limits unless otherwise noted below.    Assessment/Plan   1. Personal history of malignant melanoma of skin    2. Personal history of other malignant neoplasm of skin    3. Multiple benign melanocytic nevi    4. Scar conditions and fibrosis of skin (2)  Left Breast, Right Lower Leg - Posterior  Scars without evidence of recurrence. No palpable lymphadenopathy.    History of right calf melanoma stage T2b s/p WLE and indeterminable SLNB in 2017, managed as IIIA with surveillance for 5 years. History of SCC, BCC and AIMP without evidence of recurrence.    5. Seborrheic keratosis  Stuck on verrucous, tan-brown papules and plaques.      Benign, reassurance provided    6. Lentigo  Scattered tan macules in sun-exposed areas.    Extensive sun damage history including tanning beds. No lesions with abnormal features concerning for biopsy today. Discussed appropriate sun protection with SPF 30+, long sleeves/hats and avoiding midday sun.    7. Encounter for screening for malignant neoplasm of skin    Related Procedures  Follow Up In Dermatology - Established Patient    History of melanoma, abnormal melanocytic proliferation and BCC and SCC. Most recent skin cancer focal SCCis in September 2022. Will continue FBSE every 3 months due to extensive sun damage history, skin cancer history and patient preference.    Fanny Polk MD    I saw and evaluated the patient. I personally obtained the key and critical portions of the history and physical exam or was physically present for key and critical portions performed by the resident/fellow. I reviewed the resident/fellow's documentation and discussed the patient with the resident/fellow. I agree with the resident/fellow's medical decision making as documented in the note and made changes where appropriate.

## 2023-11-29 NOTE — PATIENT INSTRUCTIONS
It was great to see you in Dr. Herrera's clinic today. We did a full skin exam and did not notice anything concerning for skin cancer today, which is great! Continue the good sun protection with sunscreen SPF 30+, long sleeves and hats and avoiding midday sun. We will see you again in 3 months.

## 2023-12-19 ENCOUNTER — APPOINTMENT (OUTPATIENT)
Dept: OBSTETRICS AND GYNECOLOGY | Facility: CLINIC | Age: 68
End: 2023-12-19
Payer: MEDICARE

## 2023-12-20 ENCOUNTER — TELEMEDICINE CLINICAL SUPPORT (OUTPATIENT)
Dept: ENDOCRINOLOGY | Facility: CLINIC | Age: 68
End: 2023-12-20
Payer: MEDICARE

## 2023-12-20 NOTE — PROGRESS NOTES
Patient Discussion/Summary:   Met with pt on 23 for MNT discussing the [facility] diet. Pt has been on/off program but is working towards getting back on track. Pt expressed challenges with the holidays season; eating out, holiday parties and being very busy. Reports inadequate protein intake. States recent quick meals have been cream cheese and crackers. Reviewed protein needs and quick meal options. Encouraged a full 30 days of getting back to basics after the holidays; pt agreeable.      Wt Readings from Last 10 Encounters:   23 70.3 kg (155 lb)   23 71.2 kg (157 lb)   23 70.3 kg (155 lb)   23 72.1 kg (159 lb)   23 69.9 kg (154 lb)   07/10/23 70.3 kg (155 lb)   23 70.3 kg (155 lb)   23 68.9 kg (152 lb)   23 68.7 kg (151 lb 8 oz)   23 68.5 kg (151 lb)         Protein sparing modified fasting education handout was provided to patient and reviewed at this time along with daily protein and fluid recommendations. Understands need to take supplements, measure foods and test ketones going forward. No concerns or challenge areas presented by pt at this time. Encouraged to follow diet as outlined for optimal results. Discussed meal examples and provided encouragement. Pt engaged and asked multiple questions during session.       Lab Results   Component Value Date    HGBA1C 5.6 10/24/2023      Potassium   Date Value Ref Range Status   10/24/2023 3.8 3.5 - 5.3 mmol/L Final     Magnesium   Date Value Ref Range Status   07/10/2023 1.76 1.60 - 2.40 mg/dL Final      Glucose   Date Value Ref Range Status   10/24/2023 109 (H) 74 - 99 mg/dL Final       Calculations:    IBW: 120lb/54.54kg  Protein need calculated (1.5g/kg IBW) = 82g  Protein intake recommended: 84g = 12oz      Daily Meal Plan:   Morning:    3oz protein          Afternoon:  4.5oz protein + 1 vegetable from the list   Evenin.5oz protein + 1 vegetable from the list   Fluids:        64-96oz per day      Recommended supplements:  Potassium as directed with breakfast and dinner (prescription)  Fiber capsules 1-2 times daily or sugar-free fiber powder (as tolerated)   Colace (stool softener) as needed, up to 4 capsules/day  Calcium: 500-600 mg, pill form twice daily with breakfast or lunch AND dinner  Multivitamin tablet or capsule per day that is low in iron (i.e. Centrum Silver)  Magnesium: 200-250 mg, pill form with dinner or at bedtime  Bouillon cube one to two times per day as needed, if feeling dizzy   Beginning with the fourth morning of this diet, test urine every morning using Ketostix for ketone (fat) breakdown.         Liz Magdaleno

## 2023-12-27 ENCOUNTER — TELEPHONE (OUTPATIENT)
Dept: PRIMARY CARE | Facility: CLINIC | Age: 68
End: 2023-12-27
Payer: COMMERCIAL

## 2023-12-27 NOTE — TELEPHONE ENCOUNTER
Patient got a blood test for hepatitis and she was charged for the lab.  She needs it coded from diagnostic to medical necessity. She then needs it resubmitted.

## 2023-12-29 NOTE — TELEPHONE ENCOUNTER
"I use the diagnosis \"need for hepatitis c screening\"" With hyperglycemia  Glycated hemoglobin 6.3%   Will start short acting insulin for now   Accu-Checks, hypoglycemia protocol

## 2024-01-02 ENCOUNTER — APPOINTMENT (OUTPATIENT)
Dept: PRIMARY CARE | Facility: CLINIC | Age: 69
End: 2024-01-02
Payer: MEDICARE

## 2024-01-08 ENCOUNTER — TELEPHONE (OUTPATIENT)
Dept: PRIMARY CARE | Facility: CLINIC | Age: 69
End: 2024-01-08
Payer: COMMERCIAL

## 2024-01-08 NOTE — TELEPHONE ENCOUNTER
Patient has a billing issue and has been waiting for the doctor or the manager to call her back.  If someone could please call her [phone].

## 2024-01-10 ENCOUNTER — OFFICE VISIT (OUTPATIENT)
Dept: ENDOCRINOLOGY | Facility: CLINIC | Age: 69
End: 2024-01-10
Payer: MEDICARE

## 2024-01-10 VITALS — SYSTOLIC BLOOD PRESSURE: 124 MMHG | BODY MASS INDEX: 27.46 KG/M2 | DIASTOLIC BLOOD PRESSURE: 78 MMHG | WEIGHT: 165 LBS

## 2024-01-10 DIAGNOSIS — I10 BENIGN ESSENTIAL HYPERTENSION: ICD-10-CM

## 2024-01-10 DIAGNOSIS — R73.03 PREDIABETES: Primary | ICD-10-CM

## 2024-01-10 DIAGNOSIS — E78.2 MIXED HYPERLIPIDEMIA: ICD-10-CM

## 2024-01-10 PROCEDURE — 1126F AMNT PAIN NOTED NONE PRSNT: CPT | Performed by: INTERNAL MEDICINE

## 2024-01-10 PROCEDURE — 1159F MED LIST DOCD IN RCRD: CPT | Performed by: INTERNAL MEDICINE

## 2024-01-10 PROCEDURE — 99214 OFFICE O/P EST MOD 30 MIN: CPT | Performed by: INTERNAL MEDICINE

## 2024-01-10 PROCEDURE — 3074F SYST BP LT 130 MM HG: CPT | Performed by: INTERNAL MEDICINE

## 2024-01-10 PROCEDURE — 1036F TOBACCO NON-USER: CPT | Performed by: INTERNAL MEDICINE

## 2024-01-10 PROCEDURE — 3078F DIAST BP <80 MM HG: CPT | Performed by: INTERNAL MEDICINE

## 2024-01-10 NOTE — PROGRESS NOTES
Patient ID: Zakiya Barrow is a 68 y.o. female who presents for Follow-up.  HPI  The patient comes in for follow up.    She has been off track of [facility] for management of insulin resistance with prediabetes hyperlipidemia hypertension fatigue GERD and lymphedema.    Since last being seen she fractured her T8 vertebra and is waiting to be seen by neurosurgery March 11.    She has not been exercising.    She is in a brace.    She is looking to get back on track.    Physically she has no other complaints.    ROS  Comprehensive review of systems is negative.    Objective   Physical Exam  Visit Vitals  /78      Vitals:    01/10/24 0939   Weight: 74.8 kg (165 lb)      Body mass index is 27.46 kg/m².      Weight 165 up 8 pounds with brace    Eyes normal  ENT normal. No adenopathy  Thyroid palpable and normal. No nodules  Chest clear to auscultation  Heart sounds are normal  Abdomen nontender. Bowel sounds normal. No organomegaly  Feet are okay    Assessment/Plan     1.  Insulin resistance  2.  Prediabetes  3.  Hypertension  4.  Hyperlipidemia  5.  GERD    She is going to work to get back on track.  We discussed strategies for success. Planning ahead, taking things a week at a time and planning the week ahead of time.    She will exercise is much as her body allows.    She will continue her current regimen.    Follow up with the nutritionist in 1 month, me in 2 months, sooner as needed.

## 2024-01-16 NOTE — TELEPHONE ENCOUNTER
Have spoken with patient regarding the coding questions.  Z11.59 is the correct diag code for screening Hep C.  Working with billing to get itemized statement as they are saying patient has never been billed for anything.  Awaiting response.

## 2024-01-17 ENCOUNTER — APPOINTMENT (OUTPATIENT)
Dept: OBSTETRICS AND GYNECOLOGY | Facility: CLINIC | Age: 69
End: 2024-01-17
Payer: MEDICARE

## 2024-01-23 ENCOUNTER — TELEPHONE (OUTPATIENT)
Dept: PRIMARY CARE | Facility: CLINIC | Age: 69
End: 2024-01-23

## 2024-01-23 NOTE — TELEPHONE ENCOUNTER
I have spoken with two different departments regarding the $40 charge that the patient is questioning.  No one can seem to find anywhere where the patient has been billed for this.  I left a vm message for the patient requesting a copy of the invoice she received for this charge so that I can try to track down what type of bill it is and who sent it.

## 2024-01-24 NOTE — TELEPHONE ENCOUNTER
Patient has a question about her billing. She said she has spoken to Kristal before and they keep missing each other.  She said she will call back tomorrow.

## 2024-01-31 ENCOUNTER — TELEMEDICINE CLINICAL SUPPORT (OUTPATIENT)
Dept: ENDOCRINOLOGY | Facility: CLINIC | Age: 69
End: 2024-01-31
Payer: MEDICARE

## 2024-01-31 NOTE — PROGRESS NOTES
Patient Discussion/Summary:   Met with pt on 24 for MNT discussing the [facility] diet. Pt has been on/off program but is working towards getting back on track. Pt expressed challenges due to breaking her back, hectic schedule and motivation. Reports poor sleep and depression. However, has support by her . Reviewed planning ahead, quick meal examples and accountability. Will plan to check in with pt weekly for the next 30 days.     Protein sparing modified fasting education handout was previously provided to patient and reviewed at this time along with daily protein and fluid recommendations. Understands need to take supplements, measure foods and test ketones going forward. No concerns or challenge areas presented by pt at this time. Encouraged to follow diet as outlined for optimal results. Discussed meal examples and provided encouragement. Pt engaged and asked multiple questions during session.     Wt Readings from Last 4 Encounters:   01/10/24 74.8 kg (165 lb)   23 70.3 kg (155 lb)   23 71.2 kg (157 lb)   23 70.3 kg (155 lb)           Lab Results   Component Value Date    HGBA1C 5.6 10/24/2023      Calculations:    IBW: 120lb/54.54kg  Protein need calculated (1.5g/kg IBW) = 82g  Protein intake recommended: 84g = 12oz      Daily Meal Plan:   Morning:    3oz protein          Afternoon:  4.5oz protein + 1 vegetable from the list   Evenin.5oz protein + 1 vegetable from the list   Fluids:        64-96oz per day     Recommended supplements:  Potassium as directed with breakfast and dinner (prescription)  Fiber capsules 1-2 times daily or sugar-free fiber powder (as tolerated)   Colace (stool softener) as needed, up to 4 capsules/day  Calcium: 500-600 mg, pill form twice daily with breakfast or lunch AND dinner  Multivitamin tablet or capsule per day that is low in iron (i.e. Centrum Silver)  Magnesium: 200-250 mg, pill form with dinner or at bedtime  Bouillon cube one to two times per  day as needed, if feeling dizzy   Beginning with the fourth morning of this diet, test urine every morning using Ketostix for ketone (fat) breakdown.     Liz Magdaleno

## 2024-02-15 ENCOUNTER — OFFICE VISIT (OUTPATIENT)
Dept: NEUROSURGERY | Facility: CLINIC | Age: 69
End: 2024-02-15
Payer: MEDICARE

## 2024-02-15 VITALS
DIASTOLIC BLOOD PRESSURE: 86 MMHG | BODY MASS INDEX: 26.16 KG/M2 | TEMPERATURE: 97.7 F | SYSTOLIC BLOOD PRESSURE: 132 MMHG | WEIGHT: 157 LBS | HEIGHT: 65 IN | HEART RATE: 92 BPM

## 2024-02-15 DIAGNOSIS — S22.060A COMPRESSION FRACTURE OF T8 VERTEBRA, INITIAL ENCOUNTER (MULTI): Primary | ICD-10-CM

## 2024-02-15 PROCEDURE — 1036F TOBACCO NON-USER: CPT | Performed by: NURSE PRACTITIONER

## 2024-02-15 PROCEDURE — 1160F RVW MEDS BY RX/DR IN RCRD: CPT | Performed by: NURSE PRACTITIONER

## 2024-02-15 PROCEDURE — 1125F AMNT PAIN NOTED PAIN PRSNT: CPT | Performed by: NURSE PRACTITIONER

## 2024-02-15 PROCEDURE — 3075F SYST BP GE 130 - 139MM HG: CPT | Performed by: NURSE PRACTITIONER

## 2024-02-15 PROCEDURE — 1159F MED LIST DOCD IN RCRD: CPT | Performed by: NURSE PRACTITIONER

## 2024-02-15 PROCEDURE — 99204 OFFICE O/P NEW MOD 45 MIN: CPT | Performed by: NURSE PRACTITIONER

## 2024-02-15 PROCEDURE — 3079F DIAST BP 80-89 MM HG: CPT | Performed by: NURSE PRACTITIONER

## 2024-02-15 ASSESSMENT — PAIN SCALES - GENERAL: PAINLEVEL: 8

## 2024-02-15 NOTE — PROGRESS NOTES
"It was a pleasure to see Zakiya Barrow on 2/15/2024. She is a 68 y.o. year old female who presents, with her , to the [facility] for evaluation of T8 spine fracture. Patient is referred by Nathan Herrera MD. PMH is significant for HTN / HPL osteopenia (by DEXA 10/23/2023), h/o melanoma    Zakiya Barrow has had symptoms of mid back pain since 11/2023, when she lifted a heavy item from the freezer at home. Progression of symptoms prompted [facility] ED visit on 01/03/2024. Images demonstrated T8 compression fracture and chronic L1 burst compression fracture. TLSO was requested. Thus far, patient has tried TLSO with some overall improvement of spine pain. She denies change in bowel / bladder function, saddle anesthesia, imbalance, falls.     PREVIOUS TREATMENTS  TLSO  NSAIDs  Opioids  Topical    Previous Spine Surgery: No     Smoker: No   Anticoagulation / Antiplatelets: No     ROS: 12 / 12 systems reviewed and are negative unless noted in HPI    /86 (BP Location: Left arm, Patient Position: Sitting, BP Cuff Size: Adult)   Pulse 92   Temp 36.5 °C (97.7 °F) (Temporal)   Ht 1.651 m (5' 5\")   Wt 71.2 kg (157 lb)   BMI 26.13 kg/m²     ON EXAM:  General: Well developed, awake/alert/oriented x 3, no distress, alert and cooperative  Skin: Warm and dry, no visible lesions / rashes  ENMT: Mucous membranes moist, no apparent injury  Head/Neck: No apparent injury  Respiratory/Thorax: Normal breathing with good chest expansion, thorax symmetric  Cardiovascular: No pitting edema, no JVD  Gastrointestinal: Non-distended  NEUROLOGICAL EXAM:  EOMI, face symmetric  Muscle Tone: Normal without spasticity or contractures in all extremities  Muscle Bulk: Normal and symmetric in all extremities  Posture:  -- Cervical: Normal  -- Thoracic: Normal  -- Lumbar : Normal  Gait: Normal      Zakiya Barrow has T8 compression fracture. We reviewed imaging of MRI T Spine from [facility] and " discussed expected healing time for fracture.  We discussed rationale for repeat imaging (upright T Spine x-rays) to evaluate fracture healing at 8 week point from imaging (on or after 03/03/2024). Original injury was 3 months ago, expect fracture healing at this point, if indeed, that was the time of the fracture. She reports that symptoms have overall improved. She agrees to call office once images are completed for results and to determine continuation of TLSO or not.  Sonya Boogie, APRN-CNP

## 2024-02-21 ENCOUNTER — APPOINTMENT (OUTPATIENT)
Dept: NEUROSURGERY | Facility: CLINIC | Age: 69
End: 2024-02-21
Payer: COMMERCIAL

## 2024-03-04 ENCOUNTER — HOSPITAL ENCOUNTER (OUTPATIENT)
Dept: RADIOLOGY | Facility: HOSPITAL | Age: 69
Discharge: HOME | End: 2024-03-04
Payer: MEDICARE

## 2024-03-04 DIAGNOSIS — S22.060A COMPRESSION FRACTURE OF T8 VERTEBRA, INITIAL ENCOUNTER (MULTI): ICD-10-CM

## 2024-03-04 PROCEDURE — 72070 X-RAY EXAM THORAC SPINE 2VWS: CPT

## 2024-03-05 ENCOUNTER — HOSPITAL ENCOUNTER (OUTPATIENT)
Dept: RADIOLOGY | Facility: CLINIC | Age: 69
End: 2024-03-05
Payer: MEDICARE

## 2024-03-06 ENCOUNTER — TELEMEDICINE CLINICAL SUPPORT (OUTPATIENT)
Dept: ENDOCRINOLOGY | Facility: CLINIC | Age: 69
End: 2024-03-06
Payer: MEDICARE

## 2024-03-06 NOTE — PROGRESS NOTES
"Patient Discussion/Summary:   Met with pt on 24 for MNT discussing the [facility] diet. Pt expressed possible desire to pause PSMF due to  receiving a dx for alzheimer's 2 days ago. Provided \"Planning Healthy Meals Handout\" and encouraged watching portion sizes while continuing to focus on lean proteins and non-starchy vegetable intake. Pt will take some time to consider if she would like to continue PSMF.     Protein sparing modified fasting education handout was previously provided to patient and reviewed at this time along with daily protein and fluid recommendations. Understands need to take supplements, measure foods and test ketones going forward. No concerns or challenge areas presented by pt at this time. Encouraged to follow diet as outlined for optimal results. Discussed meal examples and provided encouragement. Pt engaged and asked multiple questions during session.     Wt Readings from Last 4 Encounters:   02/15/24 71.2 kg (157 lb)   01/10/24 74.8 kg (165 lb)   23 70.3 kg (155 lb)   23 71.2 kg (157 lb)        Lab Results   Component Value Date    HGBA1C 5.6 10/24/2023      Calculations:    IBW: 120lb/54.54kg  Protein need calculated (1.5g/kg IBW) = 82g  Protein intake recommended: 84g = 12oz      Daily Meal Plan:   Morning:    3oz protein          Afternoon:  4.5oz protein + 1 vegetable from the list   Evenin.5oz protein + 1 vegetable from the list   Fluids:        64-96oz per day     Recommended supplements:  Potassium as directed with breakfast and dinner (prescription)  Fiber capsules 1-2 times daily or sugar-free fiber powder (as tolerated)   Colace (stool softener) as needed, up to 4 capsules/day  Calcium: 500-600 mg, pill form twice daily with breakfast or lunch AND dinner  Multivitamin tablet or capsule per day that is low in iron (i.e. Centrum Silver)  Magnesium: 200-250 mg, pill form with dinner or at bedtime  Bouillon cube one to two times per day as needed, if feeling " dizzy   Beginning with the fourth morning of this diet, test urine every morning using Ketostix for ketone (fat) breakdown.     Liz Magdaleno

## 2024-03-08 ENCOUNTER — TELEPHONE (OUTPATIENT)
Dept: NEUROSURGERY | Facility: CLINIC | Age: 69
End: 2024-03-08
Payer: COMMERCIAL

## 2024-03-11 ENCOUNTER — APPOINTMENT (OUTPATIENT)
Dept: NEUROSURGERY | Facility: CLINIC | Age: 69
End: 2024-03-11
Payer: MEDICARE

## 2024-03-13 ENCOUNTER — OFFICE VISIT (OUTPATIENT)
Dept: DERMATOLOGY | Facility: CLINIC | Age: 69
End: 2024-03-13
Payer: MEDICARE

## 2024-03-13 DIAGNOSIS — L65.9 ALOPECIA: ICD-10-CM

## 2024-03-13 DIAGNOSIS — Z12.83 ENCOUNTER FOR SCREENING FOR MALIGNANT NEOPLASM OF SKIN: Primary | ICD-10-CM

## 2024-03-13 DIAGNOSIS — Z85.828 HISTORY OF NONMELANOMA SKIN CANCER: ICD-10-CM

## 2024-03-13 DIAGNOSIS — Z85.820 PERSONAL HISTORY OF MALIGNANT MELANOMA OF SKIN: ICD-10-CM

## 2024-03-13 DIAGNOSIS — D22.9 MULTIPLE BENIGN NEVI: ICD-10-CM

## 2024-03-13 DIAGNOSIS — L81.4 LENTIGO: ICD-10-CM

## 2024-03-13 DIAGNOSIS — L82.1 SEBORRHEIC KERATOSIS: ICD-10-CM

## 2024-03-13 PROCEDURE — 1160F RVW MEDS BY RX/DR IN RCRD: CPT | Performed by: DERMATOLOGY

## 2024-03-13 PROCEDURE — 1159F MED LIST DOCD IN RCRD: CPT | Performed by: DERMATOLOGY

## 2024-03-13 PROCEDURE — 1036F TOBACCO NON-USER: CPT | Performed by: DERMATOLOGY

## 2024-03-13 PROCEDURE — 99214 OFFICE O/P EST MOD 30 MIN: CPT | Performed by: DERMATOLOGY

## 2024-03-13 RX ORDER — MINOXIDIL 50 MG/G
1 AEROSOL, FOAM TOPICAL DAILY
Qty: 60 G | Refills: 0 | Status: SHIPPED | OUTPATIENT
Start: 2024-03-13

## 2024-03-13 NOTE — PROGRESS NOTES
Subjective     Zakiya Barrow is a 68 y.o. female with  a PMHx of stage T2b melanoma of R calf s/p WLE and indeterminable SLNB (2017), NMSC (2022), and AMH who presents for the following: Skin Check (H/o stage II melanoma). She has 3  areas of concern today: hair loss, a rough spot on her nose and a rough spot on her forehead.    Regarding hair loss, patient reports noticing increased shedding when she brushes her hair and reports that her stylist told her she is losing hair. She denies finding hair on her pillow or noting widened part.    Review of Systems:  No other skin or systemic complaints other than what is documented elsewhere in the note.    The following portions of the chart were reviewed this encounter and updated as appropriate:  Allergies  Meds         *Patient allergic to lidocaine. Can only use carbocaine (MEPIVACAINE or bupivacaine)      Skin Cancer History  - History of stage T2b melanoma of right calf s/p WLE and indeterminate SLNB 2017, followed as stage IIIA by surgical oncology with lymph node surveillance, cleared for follow up in April 2022      Lesion 1: Nodular Basal Cell Carcinoma.  Deep margins involved.  Year Diagnosed:  2019. November.  Location:  Rt upper chest  Treatment(s): excision 12/2019  Pathology: [id number]     Lesion 2: Superficial Basal Cell Carcinoma.  Deep margins involved.  Year Diagnosed:  2020.  May.  Location:  Medial Upper Chest.  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, atypia extending to peripheral margins  Year Diagnosed:  9/2022  Location:  dorsum of left hand  Treatment(s): re-scoop shave excision 11/1/22, AK on margins s/p LN2  Pathology: [id number]      Specialty Problems          Dermatology Problems    Actinic keratosis    Hemangioma of skin and subcutaneous tissue    Keloid    Lichen simplex chronicus    Melanocytic nevi of left upper limb,  including shoulder    Melanocytic nevi of scalp and neck    Melanocytic nevi of trunk    Personal history of malignant melanoma of skin    Melanoma (CMS/HCC)        Objective   Well appearing patient in no apparent distress; mood and affect are within normal limits.    Examination of lymph nodes including pre- and post-auricular, cervical, supraclavicular, axillary, inguinal, and popliteal nodes was negative for lymphadenopathy.    A full examination was performed including scalp, head, eyes, ears, nose, lips, neck, chest, axillae, abdomen, back, buttocks, bilateral upper extremities, bilateral lower extremities, hands, feet, fingers, toes, fingernails, and toenails. All findings within normal limits unless otherwise noted below.    Assessment/Plan   1. Encounter for screening for malignant neoplasm of skin    Related Procedures  Follow Up In Dermatology - Established Patient  Follow Up In Dermatology - Established Patient    2. Alopecia  Mid Parietal Scalp  Thinning at left frontal hairline; Hair pull test negative    - START topical minoxidil 5% foam  - Will obtain lab tests including TSH, CBC and Fe    Thyroid Stimulating Hormone - Mid Parietal Scalp    Iron and TIBC - Mid Parietal Scalp    minoxidiL 5 % foam - Mid Parietal Scalp  Apply 1 Application topically once daily.    CBC - Mid Parietal Scalp    3. Seborrheic keratosis  Stuck on verrucous, tan-brown papules and plaques.      - Benign finding; no intervention indicated today.      4. Lentigo  Scattered tan macules in sun-exposed areas.    - Benign finding; no intervention indicated today.      5. Multiple benign nevi  Scattered, uniform and benign-appearing, regular brown melanocytic papules and macules.    - Benign finding; no intervention indicated today.      6. History of nonmelanoma skin cancer    7. Personal history of malignant melanoma of skin      RTC in 3-4 mo for FBSE.    Maisha Singh MD, ENMANUEL  PGY-2, Department of Dermatology  I saw and  evaluated the patient. I personally obtained the key and critical portions of the history and physical exam or was physically present for key and critical portions performed by the resident/fellow. I reviewed the resident/fellow's documentation and discussed the patient with the resident/fellow. I agree with the resident/fellow's medical decision making as documented in the note and made changes where appropriate.

## 2024-03-13 NOTE — PATIENT INSTRUCTIONS
Thank you for visiting with  Dermatology today!    At your visit today, you had a full body skin check. Your skin looks good today! No lesions to treat/biopsy.    Going forward, we suggest the following:  - Try to avoid flat ironing hair  - Prescription sent for topical minoxidil foam once daily  - Labs ordered today: CBC, TSH, and iron

## 2024-03-14 ENCOUNTER — LAB (OUTPATIENT)
Dept: LAB | Facility: LAB | Age: 69
End: 2024-03-14
Payer: MEDICARE

## 2024-03-14 ENCOUNTER — OFFICE VISIT (OUTPATIENT)
Dept: ENDOCRINOLOGY | Facility: CLINIC | Age: 69
End: 2024-03-14
Payer: MEDICARE

## 2024-03-14 VITALS — BODY MASS INDEX: 27.62 KG/M2 | SYSTOLIC BLOOD PRESSURE: 126 MMHG | DIASTOLIC BLOOD PRESSURE: 74 MMHG | WEIGHT: 166 LBS

## 2024-03-14 DIAGNOSIS — I10 BENIGN ESSENTIAL HYPERTENSION: ICD-10-CM

## 2024-03-14 DIAGNOSIS — R73.03 PREDIABETES: Primary | ICD-10-CM

## 2024-03-14 DIAGNOSIS — L65.9 ALOPECIA: ICD-10-CM

## 2024-03-14 DIAGNOSIS — E78.2 MIXED HYPERLIPIDEMIA: ICD-10-CM

## 2024-03-14 LAB
ALBUMIN SERPL BCP-MCNC: 4.5 G/DL (ref 3.4–5)
ALP SERPL-CCNC: 61 U/L (ref 33–136)
ALT SERPL W P-5'-P-CCNC: 33 U/L (ref 7–45)
ANION GAP SERPL CALC-SCNC: 13 MMOL/L (ref 10–20)
AST SERPL W P-5'-P-CCNC: 20 U/L (ref 9–39)
BILIRUB SERPL-MCNC: 0.6 MG/DL (ref 0–1.2)
BUN SERPL-MCNC: 19 MG/DL (ref 6–23)
CALCIUM SERPL-MCNC: 9.8 MG/DL (ref 8.6–10.6)
CHLORIDE SERPL-SCNC: 101 MMOL/L (ref 98–107)
CO2 SERPL-SCNC: 30 MMOL/L (ref 21–32)
CREAT SERPL-MCNC: 0.72 MG/DL (ref 0.5–1.05)
EGFRCR SERPLBLD CKD-EPI 2021: >90 ML/MIN/1.73M*2
ERYTHROCYTE [DISTWIDTH] IN BLOOD BY AUTOMATED COUNT: 12.4 % (ref 11.5–14.5)
EST. AVERAGE GLUCOSE BLD GHB EST-MCNC: 117 MG/DL
GLUCOSE SERPL-MCNC: 106 MG/DL (ref 74–99)
HBA1C MFR BLD: 5.7 %
HCT VFR BLD AUTO: 42.9 % (ref 36–46)
HGB BLD-MCNC: 14.9 G/DL (ref 12–16)
IRON SATN MFR SERPL: 26 % (ref 25–45)
IRON SERPL-MCNC: 99 UG/DL (ref 35–150)
MAGNESIUM SERPL-MCNC: 1.79 MG/DL (ref 1.6–2.4)
MCH RBC QN AUTO: 32.2 PG (ref 26–34)
MCHC RBC AUTO-ENTMCNC: 34.7 G/DL (ref 32–36)
MCV RBC AUTO: 93 FL (ref 80–100)
NRBC BLD-RTO: 0 /100 WBCS (ref 0–0)
PLATELET # BLD AUTO: 282 X10*3/UL (ref 150–450)
POTASSIUM SERPL-SCNC: 4 MMOL/L (ref 3.5–5.3)
PROT SERPL-MCNC: 7 G/DL (ref 6.4–8.2)
RBC # BLD AUTO: 4.63 X10*6/UL (ref 4–5.2)
SODIUM SERPL-SCNC: 140 MMOL/L (ref 136–145)
TIBC SERPL-MCNC: 385 UG/DL (ref 240–445)
TSH SERPL-ACNC: 2.29 MIU/L (ref 0.44–3.98)
UIBC SERPL-MCNC: 286 UG/DL (ref 110–370)
URATE SERPL-MCNC: 6.7 MG/DL (ref 2.3–6.7)
WBC # BLD AUTO: 6.3 X10*3/UL (ref 4.4–11.3)

## 2024-03-14 PROCEDURE — 1036F TOBACCO NON-USER: CPT | Performed by: INTERNAL MEDICINE

## 2024-03-14 PROCEDURE — 3078F DIAST BP <80 MM HG: CPT | Performed by: INTERNAL MEDICINE

## 2024-03-14 PROCEDURE — 80053 COMPREHEN METABOLIC PANEL: CPT

## 2024-03-14 PROCEDURE — 83036 HEMOGLOBIN GLYCOSYLATED A1C: CPT

## 2024-03-14 PROCEDURE — 1160F RVW MEDS BY RX/DR IN RCRD: CPT | Performed by: INTERNAL MEDICINE

## 2024-03-14 PROCEDURE — 84550 ASSAY OF BLOOD/URIC ACID: CPT

## 2024-03-14 PROCEDURE — 84443 ASSAY THYROID STIM HORMONE: CPT

## 2024-03-14 PROCEDURE — 3074F SYST BP LT 130 MM HG: CPT | Performed by: INTERNAL MEDICINE

## 2024-03-14 PROCEDURE — 83540 ASSAY OF IRON: CPT

## 2024-03-14 PROCEDURE — 85027 COMPLETE CBC AUTOMATED: CPT

## 2024-03-14 PROCEDURE — 36415 COLL VENOUS BLD VENIPUNCTURE: CPT

## 2024-03-14 PROCEDURE — 83550 IRON BINDING TEST: CPT

## 2024-03-14 PROCEDURE — 99214 OFFICE O/P EST MOD 30 MIN: CPT | Performed by: INTERNAL MEDICINE

## 2024-03-14 PROCEDURE — 83735 ASSAY OF MAGNESIUM: CPT

## 2024-03-14 PROCEDURE — 1159F MED LIST DOCD IN RCRD: CPT | Performed by: INTERNAL MEDICINE

## 2024-03-14 PROCEDURE — 80061 LIPID PANEL: CPT

## 2024-03-14 NOTE — PROGRESS NOTES
Patient ID: Zakiya Barrow is a 68 y.o. female who presents for Follow-up.  HPI  The patient comes in for follow up.    She has been off track of [facility] for management of insulin resistance with prediabetes hyperlipidemia hypertension fatigue GERD and lymphedema.    She continues to recover from T8 vertebral fracture and has been unable to exercise.    She has been under a lot of stress with her  who has Alzheimer's.    She is in a brace.    She has had some issues with lymphedema and had a cardiac workup.    She is looking to get back on track.    Physically she has no other complaints.    ROS  Comprehensive review of systems is negative.    Objective   Physical Exam  There were no vitals taken for this visit.   Vitals:    03/14/24 0914   Weight: 75.3 kg (166 lb)      Body mass index is 27.62 kg/m².      Weight 166 up 1 pound    Eyes normal  ENT normal. No adenopathy  Thyroid palpable and normal. No nodules  Chest clear to auscultation  Heart sounds are normal  Abdomen nontender. Bowel sounds normal. No organomegaly  Feet are okay    Current Outpatient Medications   Medication Sig Dispense Refill    amLODIPine (Norvasc) 5 mg tablet Take 1 tablet (5 mg) by mouth once daily. as directed      atorvastatin (Lipitor) 40 mg tablet Take 1 tablet (40 mg) by mouth once daily.      hydroCHLOROthiazide (HYDRODiuril) 50 mg tablet Take 1 tablet (50 mg) by mouth once daily.      minoxidiL 5 % foam Apply 1 Application topically once daily. 60 g 0    potassium chloride ER (Micro-K) 10 mEq ER capsule every 12 hours.      pravastatin (Pravachol) 40 mg tablet Take 1 tablet (40 mg) by mouth once daily at bedtime.      triamterene-hydrochlorothiazid (Maxzide-25) 37.5-25 mg tablet Take 1 tablet by mouth once daily.       No current facility-administered medications for this visit.       Assessment/Plan     1.  Insulin resistance  2.  Prediabetes  3.  Hypertension  4.  Hyperlipidemia    She is going to work to get back on track.   We discussed strategies for success. Planning ahead, taking things a week at a time and planning the week ahead of time.    Will check CMP uric acid magnesium TSH and hemoglobin A1c today.    We discussed insulin resistance is pathophysiology and its impact.    We discussed that if she is unable to follow the program we will transition to a more balanced diet coupled with metformin.    Follow up with the nutritionist in 1 month, me in 2 months, sooner as needed.

## 2024-03-19 ENCOUNTER — OFFICE VISIT (OUTPATIENT)
Dept: PRIMARY CARE | Facility: CLINIC | Age: 69
End: 2024-03-19
Payer: MEDICARE

## 2024-03-19 VITALS
SYSTOLIC BLOOD PRESSURE: 132 MMHG | TEMPERATURE: 97.7 F | DIASTOLIC BLOOD PRESSURE: 79 MMHG | BODY MASS INDEX: 28.49 KG/M2 | HEIGHT: 64 IN | HEART RATE: 83 BPM | OXYGEN SATURATION: 93 %

## 2024-03-19 DIAGNOSIS — L65.9 ALOPECIA: ICD-10-CM

## 2024-03-19 DIAGNOSIS — I10 BENIGN ESSENTIAL HYPERTENSION: ICD-10-CM

## 2024-03-19 DIAGNOSIS — Z78.0 POSTMENOPAUSAL STATE: ICD-10-CM

## 2024-03-19 DIAGNOSIS — S22.060S CLOSED WEDGE COMPRESSION FRACTURE OF T8 VERTEBRA, SEQUELA: Primary | ICD-10-CM

## 2024-03-19 DIAGNOSIS — E78.2 MIXED HYPERLIPIDEMIA: ICD-10-CM

## 2024-03-19 PROBLEM — L90.5 SCAR CONDITIONS AND FIBROSIS OF SKIN: Status: RESOLVED | Noted: 2018-05-02 | Resolved: 2024-03-19

## 2024-03-19 PROBLEM — L81.4 LENTIGINOSIS: Status: RESOLVED | Noted: 2018-05-02 | Resolved: 2024-03-19

## 2024-03-19 PROBLEM — R00.2 PALPITATIONS: Status: RESOLVED | Noted: 2024-03-19 | Resolved: 2024-03-19
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
## 2024-03-19 PROBLEM — R32 URINARY INCONTINENCE: Status: ACTIVE | Noted: 2024-01-03

## 2024-03-19 PROBLEM — E88.810 METABOLIC SYNDROME: Status: RESOLVED | Noted: 2024-03-19 | Resolved: 2024-03-19

## 2024-03-19 PROBLEM — R06.02 SHORTNESS OF BREATH: Status: RESOLVED | Noted: 2022-09-01 | Resolved: 2024-03-19

## 2024-03-19 PROBLEM — M54.2 CERVICAL SPINE PAIN: Status: ACTIVE | Noted: 2024-01-03

## 2024-03-19 PROBLEM — S22.000A COMPRESSION FRACTURE OF THORACIC VERTEBRA (MULTI): Status: ACTIVE | Noted: 2024-03-19

## 2024-03-19 PROBLEM — R30.0 DYSURIA: Status: RESOLVED | Noted: 2024-03-19 | Resolved: 2024-03-19

## 2024-03-19 PROBLEM — R55 SYNCOPE: Status: RESOLVED | Noted: 2024-03-19 | Resolved: 2024-03-19

## 2024-03-19 PROBLEM — K62.5 RECTAL HEMORRHAGE: Status: RESOLVED | Noted: 2024-03-19 | Resolved: 2024-03-19

## 2024-03-19 PROBLEM — R42 DIZZINESS: Status: RESOLVED | Noted: 2024-03-19 | Resolved: 2024-03-19

## 2024-03-19 PROBLEM — S22.060A CLOSED WEDGE COMPRESSION FRACTURE OF T8 VERTEBRA (MULTI): Status: ACTIVE | Noted: 2024-03-19

## 2024-03-19 PROBLEM — R53.83 FATIGUE: Status: RESOLVED | Noted: 2024-03-19 | Resolved: 2024-03-19

## 2024-03-19 PROBLEM — R51.9 HEADACHE: Status: RESOLVED | Noted: 2024-03-19 | Resolved: 2024-03-19

## 2024-03-19 PROBLEM — M79.10 MUSCLE PAIN: Status: RESOLVED | Noted: 2024-03-19 | Resolved: 2024-03-19

## 2024-03-19 PROBLEM — R60.9 EDEMA: Status: RESOLVED | Noted: 2024-03-19 | Resolved: 2024-03-19

## 2024-03-19 PROBLEM — R21 RASH AND OTHER NONSPECIFIC SKIN ERUPTION: Status: RESOLVED | Noted: 2022-04-19 | Resolved: 2024-03-19

## 2024-03-19 PROBLEM — R71.8 HIGH HEMATOCRIT: Status: RESOLVED | Noted: 2024-03-19 | Resolved: 2024-03-19

## 2024-03-19 PROBLEM — C43.9 MELANOMA (MULTI): Status: RESOLVED | Noted: 2023-11-19 | Resolved: 2024-03-19

## 2024-03-19 PROBLEM — J20.9 ACUTE BRONCHITIS: Status: RESOLVED | Noted: 2024-03-19 | Resolved: 2024-03-19

## 2024-03-19 PROBLEM — K30 FUNCTIONAL DYSPEPSIA: Status: RESOLVED | Noted: 2023-07-10 | Resolved: 2024-03-19

## 2024-03-19 PROBLEM — D58.2 HEMOGLOBINOPATHY (CMS-HCC): Status: ACTIVE | Noted: 2024-03-19

## 2024-03-19 PROBLEM — I73.9 INTERMITTENT CLAUDICATION (CMS-HCC): Status: RESOLVED | Noted: 2024-03-19 | Resolved: 2024-03-19

## 2024-03-19 PROBLEM — S29.019A STRAIN OF MUSCLE AT THORAX LEVEL: Status: RESOLVED | Noted: 2024-03-19 | Resolved: 2024-03-19

## 2024-03-19 PROBLEM — M54.6 ACUTE THORACIC BACK PAIN: Status: ACTIVE | Noted: 2024-01-03

## 2024-03-19 LAB
CHOLEST SERPL-MCNC: 166 MG/DL (ref 0–199)
CHOLESTEROL/HDL RATIO: 2.9
HDLC SERPL-MCNC: 57.6 MG/DL
LDLC SERPL CALC-MCNC: 84 MG/DL
NON HDL CHOLESTEROL: 108 MG/DL (ref 0–149)
TRIGL SERPL-MCNC: 124 MG/DL (ref 0–149)
VLDL: 25 MG/DL (ref 0–40)

## 2024-03-19 PROCEDURE — 3075F SYST BP GE 130 - 139MM HG: CPT | Performed by: FAMILY MEDICINE

## 2024-03-19 PROCEDURE — 99214 OFFICE O/P EST MOD 30 MIN: CPT | Performed by: FAMILY MEDICINE

## 2024-03-19 PROCEDURE — 1123F ACP DISCUSS/DSCN MKR DOCD: CPT | Performed by: FAMILY MEDICINE

## 2024-03-19 PROCEDURE — 1159F MED LIST DOCD IN RCRD: CPT | Performed by: FAMILY MEDICINE

## 2024-03-19 PROCEDURE — 1158F ADVNC CARE PLAN TLK DOCD: CPT | Performed by: FAMILY MEDICINE

## 2024-03-19 PROCEDURE — 3078F DIAST BP <80 MM HG: CPT | Performed by: FAMILY MEDICINE

## 2024-03-19 PROCEDURE — 1160F RVW MEDS BY RX/DR IN RCRD: CPT | Performed by: FAMILY MEDICINE

## 2024-03-19 RX ORDER — POTASSIUM CHLORIDE 750 MG/1
10 CAPSULE, EXTENDED RELEASE ORAL 2 TIMES DAILY
Qty: 180 CAPSULE | Refills: 3 | Status: SHIPPED | OUTPATIENT
Start: 2024-03-19 | End: 2025-03-19

## 2024-03-19 RX ORDER — TRIAMTERENE/HYDROCHLOROTHIAZID 37.5-25 MG
1 TABLET ORAL DAILY
Status: CANCELLED | OUTPATIENT
Start: 2024-03-19

## 2024-03-19 RX ORDER — ATORVASTATIN CALCIUM 40 MG/1
40 TABLET, FILM COATED ORAL DAILY
Qty: 90 TABLET | Refills: 3 | Status: SHIPPED | OUTPATIENT
Start: 2024-03-19

## 2024-03-19 RX ORDER — AMLODIPINE BESYLATE 5 MG/1
5 TABLET ORAL DAILY
Status: CANCELLED | OUTPATIENT
Start: 2024-03-19

## 2024-03-19 RX ORDER — AMLODIPINE BESYLATE 5 MG/1
5 TABLET ORAL DAILY
Qty: 90 TABLET | Refills: 3 | Status: SHIPPED | OUTPATIENT
Start: 2024-03-19 | End: 2025-03-19

## 2024-03-19 RX ORDER — MINOXIDIL 50 MG/G
1 AEROSOL, FOAM TOPICAL DAILY
Qty: 60 G | Refills: 0 | Status: CANCELLED | OUTPATIENT
Start: 2024-03-19

## 2024-03-19 RX ORDER — POTASSIUM CHLORIDE 750 MG/1
CAPSULE, EXTENDED RELEASE ORAL EVERY 12 HOURS
Status: CANCELLED | OUTPATIENT
Start: 2024-03-19

## 2024-03-19 RX ORDER — PRAVASTATIN SODIUM 40 MG/1
40 TABLET ORAL NIGHTLY
Status: CANCELLED | OUTPATIENT
Start: 2024-03-19

## 2024-03-19 RX ORDER — ATORVASTATIN CALCIUM 40 MG/1
40 TABLET, FILM COATED ORAL DAILY
Status: CANCELLED | OUTPATIENT
Start: 2024-03-19

## 2024-03-19 RX ORDER — HYDROCHLOROTHIAZIDE 50 MG/1
50 TABLET ORAL DAILY
Status: CANCELLED | OUTPATIENT
Start: 2024-03-19

## 2024-03-19 RX ORDER — HYDROCHLOROTHIAZIDE 50 MG/1
50 TABLET ORAL DAILY
Qty: 90 TABLET | Refills: 3 | Status: SHIPPED | OUTPATIENT
Start: 2024-03-19

## 2024-03-19 ASSESSMENT — PATIENT HEALTH QUESTIONNAIRE - PHQ9
2. FEELING DOWN, DEPRESSED OR HOPELESS: NOT AT ALL
SUM OF ALL RESPONSES TO PHQ9 QUESTIONS 1 AND 2: 0
1. LITTLE INTEREST OR PLEASURE IN DOING THINGS: NOT AT ALL

## 2024-03-19 NOTE — PROGRESS NOTES
Subjective   Patient ID: Zakiya Barrow is a 68 y.o. female who presents for Follow-up (FU).  Very pleasant 68-year-old with history of hypertension and hyperlipidemia here today for follow-up of those chronic conditions she is stable on hydrochlorothiazide and amlodipine blood pressure is well-controlled she has had for many years sees her cardiologist her recent cardiac workup was negative with no evidence of heart she has had no palpitations syncope or angina hyperlipidemia tolerating medicine well with no side effects or issues no angina palpitations or syncope no lateralizing weakness no evidence of claudication symptoms she does wear support hose and has not had as much edema she has been seeing the endocrinologist and her weight loss plan is going very well unfortunately she recently had a vertebral compression fracture at T8 she lifted a turkey and then all of a sudden felt back pain her last bone density was good it was recommended by her neurosurgeon that she have a forearm bone density and that is being scheduled        Review of Systems  Constitutional: no chills, no fever and no night sweats.   Eyes: no blurred vision and no eyesight problems.   ENT: no hearing loss, no nasal congestion, no nasal discharge, no hoarseness and no sore throat.   Cardiovascular: no chest pain, no intermittent leg claudication, no lower extremity edema, no palpitations and no syncope.   Respiratory: no cough, no shortness of breath during exertion, no shortness of breath at rest and no wheezing.   Gastrointestinal: no abdominal pain, no blood in stools, no constipation, no diarrhea, no melena, no nausea, no rectal pain and no vomiting.   Genitourinary: no dysuria, no change in urinary frequency, no urinary hesitancy, no feelings of urinary urgency and no vaginal discharge.   Musculoskeletal: no arthralgias,  no back pain and no myalgias.   Integumentary: no new skin lesions and no rashes.   Neurological: no difficulty  "walking, no headache, no limb weakness, no numbness and no tingling.   Psychiatric: no anxiety, no depression, no anhedonia and no substance use disorders.   Endocrine: no recent weight gain and no recent weight loss.   Hematologic/Lymphatic: no tendency for easy bruising and no swollen glands .    Objective    /79   Pulse 83   Temp 36.5 °C (97.7 °F)   Ht 1.626 m (5' 4\")   SpO2 93%   BMI 28.49 kg/m²    Physical Exam  The patient appeared well nourished and normally developed. Vital signs as documented. Head exam is unremarkable. No scleral icterus or corneal arcus noted.  Pupils are equal round reactive to light extraocular movements are intact no hemorrhages noted on funduscopic exam mouth mucous membranes are moist no exudates ears canals clear TMs are gray pearly not injected nose no rhinorrhea or epistaxis Neck is without jugular venous distension, thyromegaly, or carotid bruits. Carotid upstrokes are brisk bilaterally. Lungs are clear to auscultation and percussion. Cardiac exam reveals the PMI to be normally sized and situated. Rhythm is regular. First and second heart sounds normal. No murmurs, rubs or gallops. Abdominal exam reveals normal bowel sounds, no masses, no organomegaly and no aortic enlargement. Extremities are nonedematous and both femoral and pedal pulses are normal.  Neurologic exam DTRs are equal bilaterally no focal deficits strength is symmetrical heme lymph no palpable lymph nodes in the neck axilla or groin    Assessment/Plan   Problem List Items Addressed This Visit       Benign essential hypertension     Stable and controlled  Continue current medication and managment         Relevant Medications    potassium chloride ER (Micro-K) 10 mEq ER capsule    amLODIPine (Norvasc) 5 mg tablet    hydroCHLOROthiazide (HYDRODiuril) 50 mg tablet    Mixed hyperlipidemia     Stable and controlled  Continue current medication and managment         Relevant Medications    atorvastatin " (Lipitor) 40 mg tablet    Other Relevant Orders    Lipid Panel (Completed)    Closed wedge compression fracture of T8 vertebra (CMS/HCC) - Primary     Stable and controlled  Continue current medication and management   Close observation and followup          Other Visit Diagnoses       Alopecia        Postmenopausal state        Relevant Orders    XR DEXA bone density extremity                 Miryam Lozoya MD

## 2024-03-31 PROBLEM — S22.000A COMPRESSION FRACTURE OF THORACIC VERTEBRA (MULTI): Status: RESOLVED | Noted: 2024-03-19 | Resolved: 2024-03-31

## 2024-03-31 PROBLEM — D58.2 HEMOGLOBINOPATHY (CMS-HCC): Status: RESOLVED | Noted: 2024-03-19 | Resolved: 2024-03-31

## 2024-04-01 NOTE — ASSESSMENT & PLAN NOTE
Stable and controlled  Continue current medication and management   Close observation and followup

## 2024-04-08 ENCOUNTER — APPOINTMENT (OUTPATIENT)
Dept: RADIOLOGY | Facility: CLINIC | Age: 69
End: 2024-04-08
Payer: MEDICARE

## 2024-04-11 ENCOUNTER — HOSPITAL ENCOUNTER (OUTPATIENT)
Dept: RADIOLOGY | Facility: HOSPITAL | Age: 69
Discharge: HOME | End: 2024-04-11
Payer: MEDICARE

## 2024-04-11 VITALS — HEIGHT: 65 IN | WEIGHT: 164 LBS | BODY MASS INDEX: 27.32 KG/M2

## 2024-04-11 DIAGNOSIS — Z12.31 ENCOUNTER FOR SCREENING MAMMOGRAM FOR BREAST CANCER: ICD-10-CM

## 2024-04-11 PROCEDURE — 77067 SCR MAMMO BI INCL CAD: CPT

## 2024-04-11 PROCEDURE — 77067 SCR MAMMO BI INCL CAD: CPT | Mod: BILATERAL PROCEDURE | Performed by: STUDENT IN AN ORGANIZED HEALTH CARE EDUCATION/TRAINING PROGRAM

## 2024-04-11 PROCEDURE — 77063 BREAST TOMOSYNTHESIS BI: CPT | Mod: BILATERAL PROCEDURE | Performed by: STUDENT IN AN ORGANIZED HEALTH CARE EDUCATION/TRAINING PROGRAM

## 2024-04-15 ENCOUNTER — OFFICE VISIT (OUTPATIENT)
Dept: PRIMARY CARE | Facility: CLINIC | Age: 69
End: 2024-04-15
Payer: MEDICARE

## 2024-04-15 VITALS
BODY MASS INDEX: 27.66 KG/M2 | HEIGHT: 65 IN | OXYGEN SATURATION: 94 % | WEIGHT: 166 LBS | HEART RATE: 88 BPM | SYSTOLIC BLOOD PRESSURE: 117 MMHG | DIASTOLIC BLOOD PRESSURE: 77 MMHG | TEMPERATURE: 97.3 F

## 2024-04-15 DIAGNOSIS — R68.89 FLU-LIKE SYMPTOMS: Primary | ICD-10-CM

## 2024-04-15 LAB
POC RAPID INFLUENZA A: NEGATIVE
POC RAPID INFLUENZA B: NEGATIVE
POC SARS-COV-2 AG BINAX: NORMAL

## 2024-04-15 PROCEDURE — 3074F SYST BP LT 130 MM HG: CPT | Performed by: FAMILY MEDICINE

## 2024-04-15 PROCEDURE — 1160F RVW MEDS BY RX/DR IN RCRD: CPT | Performed by: FAMILY MEDICINE

## 2024-04-15 PROCEDURE — 99213 OFFICE O/P EST LOW 20 MIN: CPT | Performed by: FAMILY MEDICINE

## 2024-04-15 PROCEDURE — 1124F ACP DISCUSS-NO DSCNMKR DOCD: CPT | Performed by: FAMILY MEDICINE

## 2024-04-15 PROCEDURE — 1159F MED LIST DOCD IN RCRD: CPT | Performed by: FAMILY MEDICINE

## 2024-04-15 PROCEDURE — 87804 INFLUENZA ASSAY W/OPTIC: CPT | Performed by: FAMILY MEDICINE

## 2024-04-15 PROCEDURE — 87811 SARS-COV-2 COVID19 W/OPTIC: CPT | Performed by: FAMILY MEDICINE

## 2024-04-15 PROCEDURE — 1036F TOBACCO NON-USER: CPT | Performed by: FAMILY MEDICINE

## 2024-04-15 PROCEDURE — 3078F DIAST BP <80 MM HG: CPT | Performed by: FAMILY MEDICINE

## 2024-04-15 RX ORDER — AZITHROMYCIN 250 MG/1
TABLET, FILM COATED ORAL DAILY
Qty: 6 TABLET | Refills: 0 | Status: SHIPPED | OUTPATIENT
Start: 2024-04-15 | End: 2024-04-20

## 2024-04-15 RX ORDER — BROMPHENIRAMINE MALEATE, PSEUDOEPHEDRINE HYDROCHLORIDE, AND DEXTROMETHORPHAN HYDROBROMIDE 2; 30; 10 MG/5ML; MG/5ML; MG/5ML
5 SYRUP ORAL 4 TIMES DAILY PRN
Qty: 120 ML | Refills: 0 | Status: SHIPPED | OUTPATIENT
Start: 2024-04-15 | End: 2024-04-25

## 2024-04-15 ASSESSMENT — PATIENT HEALTH QUESTIONNAIRE - PHQ9
SUM OF ALL RESPONSES TO PHQ9 QUESTIONS 1 AND 2: 0
2. FEELING DOWN, DEPRESSED OR HOPELESS: NOT AT ALL
1. LITTLE INTEREST OR PLEASURE IN DOING THINGS: NOT AT ALL

## 2024-04-15 NOTE — PROGRESS NOTES
"Subjective   Patient ID: Zakiya Barrow is a 69 y.o. female who presents for Sick Visit (Cough, urban, drainage x 1 week).  Very pleasant 69-year-old with cough congestion and drainage for 1 week has history of hyperlipidemia and hypertension which are risk factors noted for close contacts have been ill no exposures that she is aware of wears a mask when she goes out in public and her vaccinations including flu shot are up-to-date  Felt miserable congestion harsh cough not getting any better no chills no shortness of breath        Review of Systems  Constitutional: no chills, no fever and no night sweats.   Eyes: no blurred vision and no eyesight problems.   ENT: no hearing loss, no nasal congestion, no nasal discharge, no hoarseness and no sore throat.   Cardiovascular: no chest pain, no intermittent leg claudication, no lower extremity edema, no palpitations and no syncope.   Respiratory: no cough, no shortness of breath during exertion, no shortness of breath at rest and no wheezing.   Gastrointestinal: no abdominal pain, no blood in stools, no constipation, no diarrhea, no melena, no nausea, no rectal pain and no vomiting.   Genitourinary: no dysuria, no change in urinary frequency, no urinary hesitancy, no feelings of urinary urgency and no vaginal discharge.   Musculoskeletal: no arthralgias,  no back pain and no myalgias.   Integumentary: no new skin lesions and no rashes.   Neurological: no difficulty walking, no headache, no limb weakness, no numbness and no tingling.   Psychiatric: no anxiety, no depression, no anhedonia and no substance use disorders.   Endocrine: no recent weight gain and no recent weight loss.   Hematologic/Lymphatic: no tendency for easy bruising and no swollen glands .    Objective    /77   Pulse 88   Temp 36.3 °C (97.3 °F)   Ht 1.651 m (5' 5\")   Wt 75.3 kg (166 lb)   LMP  (LMP Unknown)   SpO2 94%   BMI 27.62 kg/m²    Physical Exam  The patient appeared well nourished " and normally developed. Vital signs as documented. Head exam is unremarkable. No scleral icterus or corneal arcus noted.  Pupils are equal round reactive to light extraocular movements are intact no hemorrhages noted on funduscopic exam mouth mucous membranes are moist no exudates ears canals clear TMs are gray pearly not injected nose no rhinorrhea or epistaxis Neck is without jugular venous distension, thyromegaly, or carotid bruits. Carotid upstrokes are brisk bilaterally. Lungs are clear to auscultation and percussion. Cardiac exam reveals the PMI to be normally sized and situated. Rhythm is regular. First and second heart sounds normal. No murmurs, rubs or gallops. Abdominal exam reveals normal bowel sounds, no masses, no organomegaly and no aortic enlargement. Extremities are nonedematous and both femoral and pedal pulses are normal.  Neurologic exam DTRs are equal bilaterally no focal deficits strength is symmetrical heme lymph no palpable lymph nodes in the neck axilla or groin  No accessory muscle use or retractions nose congestion harsh cough  Assessment/Plan   Problem List Items Addressed This Visit       Flu-like symptoms - Primary    Relevant Orders    POCT Influenza A/B manually resulted (Completed)    POCT BinaxNOW Covid-19 Ag Card manually resulted (Completed)    XR chest 2 views   Flulike symptoms upper respiratory  Multiple risk factors  Z-Michel  Bromfed  Most likely bronchitis  Check chest x-ray           Miryam Lozoya MD

## 2024-04-29 ENCOUNTER — APPOINTMENT (OUTPATIENT)
Dept: RADIOLOGY | Facility: CLINIC | Age: 69
End: 2024-04-29
Payer: MEDICARE

## 2024-04-30 PROBLEM — R68.89 FLU-LIKE SYMPTOMS: Status: ACTIVE | Noted: 2024-04-30

## 2024-04-30 PROBLEM — L65.9 ALOPECIA: Status: RESOLVED | Noted: 2024-04-30 | Resolved: 2024-04-30

## 2024-04-30 PROBLEM — H91.90 HEARING LOSS: Status: RESOLVED | Noted: 2024-04-02 | Resolved: 2024-04-30

## 2024-05-08 ENCOUNTER — TELEMEDICINE CLINICAL SUPPORT (OUTPATIENT)
Dept: ENDOCRINOLOGY | Facility: CLINIC | Age: 69
End: 2024-05-08
Payer: MEDICARE

## 2024-05-08 NOTE — PROGRESS NOTES
Patient Discussion/Summary:   Met with pt on 24 for MNT discussing the [facility] diet. Pt has been off track with [facility] due to hectic lifestyle and  dx of alzheimer's. Was relatively caution of CHO intake throughout this time. Unknown wt at this time due to fear of gain. Has an [place] trip on  and expressed wt loss goal prior. States being more sedentary but would like to start going on daily walks. Discussed how even a 10 minute walk daily is beneficial for mental and physical health. Pt will work towards getting back on track with [facility].    Protein sparing modified fasting education handout was previously provided to patient and reviewed at this time along with daily protein and fluid recommendations. Understands need to take supplements, measure foods and test ketones going forward. No concerns or challenge areas presented by pt at this time. Encouraged to follow diet as outlined for optimal results. Discussed meal examples and provided encouragement. Pt engaged and asked multiple questions during session.     Wt Readings from Last 4 Encounters:   04/15/24 75.3 kg (166 lb)   24 74.4 kg (164 lb)   24 75.3 kg (166 lb)   02/15/24 71.2 kg (157 lb)       Lab Results   Component Value Date    HGBA1C 5.6 10/24/2023      Calculations:    IBW: 120lb/54.54kg  Protein need calculated (1.5g/kg IBW) = 82g  Protein intake recommended: 84g = 12oz      Daily Meal Plan:   Morning:    3oz protein          Afternoon:  4.5oz protein + 1 vegetable from the list   Evenin.5oz protein + 1 vegetable from the list   Fluids:        64-96oz per day     Recommended supplements:  Potassium as directed with breakfast and dinner (prescription)  Fiber capsules 1-2 times daily or sugar-free fiber powder (as tolerated)   Colace (stool softener) as needed, up to 4 capsules/day  Calcium: 500-600 mg, pill form twice daily with breakfast or lunch AND dinner  Multivitamin tablet or capsule per day that is low in iron (i.e.  Centrum Silver)  Magnesium: 200-250 mg, pill form with dinner or at bedtime  Bouillon cube one to two times per day as needed, if feeling dizzy   Beginning with the fourth morning of this diet, test urine every morning using Ketostix for ketone (fat) breakdown.     Liz Magdaleno

## 2024-05-20 ENCOUNTER — OFFICE VISIT (OUTPATIENT)
Dept: ENDOCRINOLOGY | Facility: CLINIC | Age: 69
End: 2024-05-20
Payer: MEDICARE

## 2024-05-20 VITALS — SYSTOLIC BLOOD PRESSURE: 122 MMHG | WEIGHT: 162 LBS | DIASTOLIC BLOOD PRESSURE: 78 MMHG | BODY MASS INDEX: 26.96 KG/M2

## 2024-05-20 DIAGNOSIS — R73.03 PREDIABETES: Primary | ICD-10-CM

## 2024-05-20 DIAGNOSIS — E78.2 MIXED HYPERLIPIDEMIA: ICD-10-CM

## 2024-05-20 DIAGNOSIS — I10 BENIGN ESSENTIAL HYPERTENSION: ICD-10-CM

## 2024-05-20 PROCEDURE — 3078F DIAST BP <80 MM HG: CPT | Performed by: INTERNAL MEDICINE

## 2024-05-20 PROCEDURE — 1159F MED LIST DOCD IN RCRD: CPT | Performed by: INTERNAL MEDICINE

## 2024-05-20 PROCEDURE — 3074F SYST BP LT 130 MM HG: CPT | Performed by: INTERNAL MEDICINE

## 2024-05-20 PROCEDURE — 1160F RVW MEDS BY RX/DR IN RCRD: CPT | Performed by: INTERNAL MEDICINE

## 2024-05-20 PROCEDURE — 99214 OFFICE O/P EST MOD 30 MIN: CPT | Performed by: INTERNAL MEDICINE

## 2024-05-20 NOTE — PROGRESS NOTES
Patient ID: Zakiya Barrow is a 69 y.o. female who presents for Follow-up.  HPI  The patient comes in for follow up.    She has been off track of [facility] for management of insulin resistance with prediabetes hyperlipidemia hypertension fatigue GERD and lymphedema.    She has been following closer to the diet but not in 100%.    She has been under a lot of stress with her  who has Alzheimer's.    She will be going to [place] for 2 weeks in June and plans to get off track but indicates that she will be walking more.    Physically she has no other complaints.      ROS  Comprehensive review of systems is negative.    Objective   Physical Exam  Visit Vitals  /78      Vitals:    05/20/24 0836   Weight: 73.5 kg (162 lb)      Body mass index is 26.96 kg/m².      Weight 162 down 4 pounds    Eyes normal  ENT normal. No adenopathy  Thyroid palpable and normal. No nodules  Chest clear to auscultation  Heart sounds are normal  Abdomen nontender. Bowel sounds normal. No organomegaly  Feet are okay    Current Outpatient Medications   Medication Sig Dispense Refill    amLODIPine (Norvasc) 5 mg tablet Take 1 tablet (5 mg) by mouth once daily. as directed 90 tablet 3    atorvastatin (Lipitor) 40 mg tablet Take 1 tablet (40 mg) by mouth once daily. 90 tablet 3    hydroCHLOROthiazide (HYDRODiuril) 50 mg tablet Take 1 tablet (50 mg) by mouth once daily. 90 tablet 3    minoxidiL 5 % foam Apply 1 Application topically once daily. 60 g 0    potassium chloride ER (Micro-K) 10 mEq ER capsule Take 1 capsule (10 mEq) by mouth 2 times a day. 180 capsule 3     No current facility-administered medications for this visit.       Assessment/Plan     1.  Insulin resistance with prediabetes  2.  Hypertension  3.  Hyperlipidemia    Encouraged her to stay on track.  We discussed strategies for success. Planning ahead, taking things a week at a time and planning the week ahead of time.    We discussed the upcoming trip.    She will work on  exercise.    She will continue her current regimen.    We discussed weight loss drugs but we discussed trying to avoid them because of cost.    Follow up with the nutritionist in 1 month, me in 2 months, sooner as needed.

## 2024-05-23 ENCOUNTER — OFFICE VISIT (OUTPATIENT)
Dept: URGENT CARE | Facility: CLINIC | Age: 69
End: 2024-05-23
Payer: MEDICARE

## 2024-05-23 ENCOUNTER — HOSPITAL ENCOUNTER (OUTPATIENT)
Dept: RADIOLOGY | Facility: EXTERNAL LOCATION | Age: 69
Discharge: HOME | End: 2024-05-23

## 2024-05-23 ENCOUNTER — TELEPHONE (OUTPATIENT)
Dept: URGENT CARE | Facility: CLINIC | Age: 69
End: 2024-05-23

## 2024-05-23 VITALS
HEART RATE: 97 BPM | DIASTOLIC BLOOD PRESSURE: 70 MMHG | SYSTOLIC BLOOD PRESSURE: 121 MMHG | RESPIRATION RATE: 18 BRPM | TEMPERATURE: 97.3 F | OXYGEN SATURATION: 95 %

## 2024-05-23 DIAGNOSIS — S29.9XXA INJURY OF CHEST WALL, INITIAL ENCOUNTER: ICD-10-CM

## 2024-05-23 DIAGNOSIS — S20.211A CHEST WALL CONTUSION, RIGHT, INITIAL ENCOUNTER: ICD-10-CM

## 2024-05-23 DIAGNOSIS — S22.41XA CLOSED FRACTURE OF MULTIPLE RIBS OF RIGHT SIDE, INITIAL ENCOUNTER: Primary | ICD-10-CM

## 2024-05-23 DIAGNOSIS — S29.9XXA INJURY OF CHEST WALL, INITIAL ENCOUNTER: Primary | ICD-10-CM

## 2024-05-23 PROCEDURE — 99204 OFFICE O/P NEW MOD 45 MIN: CPT | Performed by: PHYSICIAN ASSISTANT

## 2024-05-23 PROCEDURE — 1160F RVW MEDS BY RX/DR IN RCRD: CPT | Performed by: PHYSICIAN ASSISTANT

## 2024-05-23 PROCEDURE — 3078F DIAST BP <80 MM HG: CPT | Performed by: PHYSICIAN ASSISTANT

## 2024-05-23 PROCEDURE — 1159F MED LIST DOCD IN RCRD: CPT | Performed by: PHYSICIAN ASSISTANT

## 2024-05-23 PROCEDURE — 1125F AMNT PAIN NOTED PAIN PRSNT: CPT | Performed by: PHYSICIAN ASSISTANT

## 2024-05-23 PROCEDURE — 3074F SYST BP LT 130 MM HG: CPT | Performed by: PHYSICIAN ASSISTANT

## 2024-05-23 ASSESSMENT — ENCOUNTER SYMPTOMS
NEUROLOGICAL NEGATIVE: 1
ALLERGIC/IMMUNOLOGIC NEGATIVE: 1
EYES NEGATIVE: 1
PSYCHIATRIC NEGATIVE: 1
HEMATOLOGIC/LYMPHATIC NEGATIVE: 1
ENDOCRINE NEGATIVE: 1
MUSCULOSKELETAL NEGATIVE: 1
ABDOMINAL PAIN: 0
COUGH: 0
SHORTNESS OF BREATH: 0
CONSTITUTIONAL NEGATIVE: 1

## 2024-05-23 ASSESSMENT — PAIN SCALES - GENERAL: PAINLEVEL: 6

## 2024-05-23 NOTE — PATIENT INSTRUCTIONS
Tylenol, motrin, or aleve as directed for pain  Ice painful area 2-3 times a day for pain or swelling  Splint right side with a pillow for cough, sneeze or reaching  Pcp follow up this week if not improving or worsening  ER visit anytime 24/7 for acute worsening or changing condition

## 2024-05-23 NOTE — PROGRESS NOTES
Subjective   Patient ID: Zakiya Barrow is a 69 y.o. female.      History provided by:  Patient   used: No    Trauma  Mechanism of injury: Fall     Current symptoms:       Associated symptoms:             Reports chest pain.             Denies abdominal pain.     This is a 69 yr old female here for right chest wall injury. Pt lost her footing/balance today and fell into a pile of plastic bins during a yard sale she was having. Persistant right lower chest wall pain. No dyspnea or cough. No abdominal pain. No other injuries.    Review of Systems   Constitutional: Negative.    HENT: Negative.     Eyes: Negative.    Respiratory:  Negative for cough and shortness of breath.    Cardiovascular:  Positive for chest pain.   Gastrointestinal:  Negative for abdominal pain.   Endocrine: Negative.    Genitourinary: Negative.    Musculoskeletal: Negative.    Skin: Negative.    Allergic/Immunologic: Negative.    Neurological: Negative.    Hematological: Negative.    Psychiatric/Behavioral: Negative.     All other systems reviewed and are negative.  /70 (BP Location: Left arm, Patient Position: Sitting, BP Cuff Size: Large adult)   Pulse 97   Temp 36.3 °C (97.3 °F)   Resp 18   LMP  (LMP Unknown)   SpO2 95%     Objective   Physical Exam  Vitals and nursing note reviewed.   Constitutional:       Appearance: Normal appearance.   HENT:      Head: Normocephalic and atraumatic.   Cardiovascular:      Rate and Rhythm: Normal rate and regular rhythm.   Pulmonary:      Effort: Pulmonary effort is normal.      Breath sounds: Normal breath sounds.   Chest:      Chest wall: Tenderness (right lower chest wall pain with palpation) present.   Abdominal:      Palpations: Abdomen is soft.      Tenderness: There is no abdominal tenderness.   Skin:     General: Skin is warm and dry.   Neurological:      General: No focal deficit present.      Mental Status: She is alert and oriented to person, place, and time.    Psychiatric:         Mood and Affect: Mood normal.         Behavior: Behavior normal.     Assessment:  Right chest wall contusion  Injury of chest wall    Plan:  Right rib/chest xray without fx or pneumothorax  Motrin, tylenol or aleve as directed for pain  Ice painful area 2-3 times a day  Splint right chest with a pillow for cough, sneeze or reaching  Pcp follow up this week if not improving or worsening  ER visit anytime 24/7 for acute worsening or changing condition

## 2024-05-23 NOTE — TELEPHONE ENCOUNTER
Pt notified of rib fxs seen on xray. No change in management as discussed at OV. Pcp follow up this week if not improving or worsening

## 2024-05-24 ENCOUNTER — TELEPHONE (OUTPATIENT)
Dept: PRIMARY CARE | Facility: CLINIC | Age: 69
End: 2024-05-24
Payer: COMMERCIAL

## 2024-05-24 NOTE — TELEPHONE ENCOUNTER
Caller self, pt fell and broke some ribs. She went to urgent care and they did not wrap her and wanted to be wrapped because she is going out of state on Wednesday of next weak. She is asking for some meds and muscle relaxing . What do you recommend?

## 2024-05-25 DIAGNOSIS — S20.219A CONTUSION OF RIB, UNSPECIFIED LATERALITY, INITIAL ENCOUNTER: Primary | ICD-10-CM

## 2024-05-25 RX ORDER — METHOCARBAMOL 500 MG/1
500 TABLET, FILM COATED ORAL 4 TIMES DAILY PRN
Qty: 40 TABLET | Refills: 0 | Status: SHIPPED | OUTPATIENT
Start: 2024-05-25 | End: 2024-07-24

## 2024-05-25 NOTE — TELEPHONE ENCOUNTER
Please call patient  I called in a muscle relaxer for her  Please tell her it is important to deep bruits  Please tell her rapping is not a good idea and to watch for signs of pneumonia also did not sleep laying down in bed it is best to sleep in a recliner with a pillow for support

## 2024-06-05 ENCOUNTER — OFFICE VISIT (OUTPATIENT)
Dept: PRIMARY CARE | Facility: CLINIC | Age: 69
End: 2024-06-05
Payer: MEDICARE

## 2024-06-05 VITALS
DIASTOLIC BLOOD PRESSURE: 71 MMHG | HEART RATE: 71 BPM | BODY MASS INDEX: 28 KG/M2 | OXYGEN SATURATION: 93 % | SYSTOLIC BLOOD PRESSURE: 124 MMHG | HEIGHT: 64 IN | TEMPERATURE: 97.5 F | WEIGHT: 164 LBS

## 2024-06-05 DIAGNOSIS — S22.41XD CLOSED FRACTURE OF MULTIPLE RIBS OF RIGHT SIDE WITH ROUTINE HEALING: Primary | ICD-10-CM

## 2024-06-05 PROCEDURE — 1124F ACP DISCUSS-NO DSCNMKR DOCD: CPT | Performed by: FAMILY MEDICINE

## 2024-06-05 PROCEDURE — 3078F DIAST BP <80 MM HG: CPT | Performed by: FAMILY MEDICINE

## 2024-06-05 PROCEDURE — 1160F RVW MEDS BY RX/DR IN RCRD: CPT | Performed by: FAMILY MEDICINE

## 2024-06-05 PROCEDURE — 3074F SYST BP LT 130 MM HG: CPT | Performed by: FAMILY MEDICINE

## 2024-06-05 PROCEDURE — 1159F MED LIST DOCD IN RCRD: CPT | Performed by: FAMILY MEDICINE

## 2024-06-05 PROCEDURE — 1036F TOBACCO NON-USER: CPT | Performed by: FAMILY MEDICINE

## 2024-06-05 PROCEDURE — 99214 OFFICE O/P EST MOD 30 MIN: CPT | Performed by: FAMILY MEDICINE

## 2024-06-05 RX ORDER — MELOXICAM 7.5 MG/1
7.5 TABLET ORAL DAILY
Qty: 30 TABLET | Refills: 2 | Status: SHIPPED | OUTPATIENT
Start: 2024-06-05 | End: 2025-06-05

## 2024-06-05 ASSESSMENT — PATIENT HEALTH QUESTIONNAIRE - PHQ9
1. LITTLE INTEREST OR PLEASURE IN DOING THINGS: NOT AT ALL
2. FEELING DOWN, DEPRESSED OR HOPELESS: NOT AT ALL
SUM OF ALL RESPONSES TO PHQ9 QUESTIONS 1 AND 2: 0
1. LITTLE INTEREST OR PLEASURE IN DOING THINGS: NOT AT ALL
2. FEELING DOWN, DEPRESSED OR HOPELESS: NOT AT ALL
SUM OF ALL RESPONSES TO PHQ9 QUESTIONS 1 AND 2: 0

## 2024-06-05 NOTE — PROGRESS NOTES
Subjective   Patient ID: Zakiya Barrow is a 69 y.o. female who presents for Follow-up (FU from  visit broke ribs and injured arm ).  Pleasant 69-year-old pleasant woman at [employer] sale with her daughter last week when she accidentally tripped and fell on her right side against a heavy plastic box and she went to urgent care and had sustained a fracture of her sixth and seventh rib that she has been trying to sleep in a reclining chair it has been a little bit tough she is due to travel to [place] with her daughter and her  and her daughter's family and her granddaughters and is nervous about getting on the plane she took some Motrin but it seems to be dropping her blood pressure otherwise she is doing well she does not normally fall this was an accident because of all the boxes that were away at the [employer] to help  No fever no difficulty breathing no cough still has a lot of discomfort with a deep breath and it does hurt to move a little bit if she twists to        Review of Systems  Constitutional: no chills, no fever and no night sweats.   Eyes: no blurred vision and no eyesight problems.   ENT: no hearing loss, no nasal congestion, no nasal discharge, no hoarseness and no sore throat.   Cardiovascular: no chest pain, no intermittent leg claudication, no lower extremity edema, no palpitations and no syncope.   Respiratory: no cough, no shortness of breath during exertion, no shortness of breath at rest and no wheezing.   Gastrointestinal: no abdominal pain, no blood in stools, no constipation, no diarrhea, no melena, no nausea, no rectal pain and no vomiting.   Genitourinary: no dysuria, no change in urinary frequency, no urinary hesitancy, no feelings of urinary urgency and no vaginal discharge.   Musculoskeletal: no arthralgias,  no back pain and no myalgias.   Integumentary: no new skin lesions and no rashes.   Neurological: no difficulty walking, no headache, no limb weakness, no numbness and no  "tingling.   Psychiatric: no anxiety, no depression, no anhedonia and no substance use disorders.   Endocrine: no recent weight gain and no recent weight loss.   Hematologic/Lymphatic: no tendency for easy bruising and no swollen glands .    Objective    /71   Pulse 71   Temp 36.4 °C (97.5 °F)   Ht 1.626 m (5' 4\")   Wt 74.4 kg (164 lb)   LMP  (LMP Unknown)   SpO2 93%   BMI 28.15 kg/m²    Physical Exam  The patient appeared well nourished and normally developed. Vital signs as documented. Head exam is unremarkable. No scleral icterus or corneal arcus noted.  Pupils are equal round reactive to light extraocular movements are intact no hemorrhages noted on funduscopic exam mouth mucous membranes are moist no exudates ears canals clear TMs are gray pearly not injected nose no rhinorrhea or epistaxis Neck is without jugular venous distension, thyromegaly, or carotid bruits. Carotid upstrokes are brisk bilaterally. Lungs are clear to auscultation and percussion. Cardiac exam reveals the PMI to be normally sized and situated. Rhythm is regular. First and second heart sounds normal. No murmurs, rubs or gallops. Abdominal exam reveals normal bowel sounds, no masses, no organomegaly and no aortic enlargement. Extremities are nonedematous and both femoral and pedal pulses are normal.  Neurologic exam DTRs are equal bilaterally no focal deficits strength is symmetrical heme lymph no palpable lymph nodes in the neck axilla or groin  Tenderness over the ribs lung sounds throughout   Assessment/Plan   Problem List Items Addressed This Visit       Closed fracture of multiple ribs of right side with routine healing - Primary    Relevant Medications    meloxicam (Mobic) 7.5 mg tablet   Rib pain after fall  Screen for osteoporosis  Meloxicam for pain relief   Monitor for signs of pnemonia  Followup in 6 weeks   Increase calcium and vitamin d intake            Miryam Lozoya MD  "

## 2024-06-12 ENCOUNTER — APPOINTMENT (OUTPATIENT)
Dept: DERMATOLOGY | Facility: CLINIC | Age: 69
End: 2024-06-12
Payer: COMMERCIAL

## 2024-07-01 ENCOUNTER — APPOINTMENT (OUTPATIENT)
Dept: ENDOCRINOLOGY | Facility: CLINIC | Age: 69
End: 2024-07-01
Payer: MEDICARE

## 2024-07-01 VITALS — BODY MASS INDEX: 27.81 KG/M2 | WEIGHT: 162 LBS

## 2024-07-01 NOTE — PROGRESS NOTES
Patient Discussion/Summary:   Met with pt on 24 for MNT discussing the PSMF diet. Pt has been off track with [facility] due to a 3 week [place] trip. During this time, was consuming an increase in CHO but also increased activity levels. .Wt is down since last month. Will focus on getting back on track with [facility]. Reports no trips or large social gatherings for the next 60 days.  Encouraged planning/prepping ahead to help stay successful.     Protein sparing modified fasting education handout was previously provided to patient and reviewed at this time along with daily protein and fluid recommendations. Understands need to take supplements, measure foods and test ketones going forward. No concerns or challenge areas presented by pt at this time. Encouraged to follow diet as outlined for optimal results. Discussed meal examples and provided encouragement. Pt engaged and asked multiple questions during session.     Wt Readings from Last 6 Encounters:   24 73.5 kg (162 lb)   24 74.4 kg (164 lb)   24 73.5 kg (162 lb)   04/15/24 75.3 kg (166 lb)   24 74.4 kg (164 lb)   24 75.3 kg (166 lb)     Lab Results   Component Value Date    HGBA1C 5.6 10/24/2023      Calculations:    IBW: 120lb/54.54kg  Protein need calculated (1.5g/kg IBW) = 82g  Protein intake recommended: 84g = 12oz      Daily Meal Plan:   Morning:    3oz protein          Afternoon:  4.5oz protein + 1 vegetable from the list   Evenin.5oz protein + 1 vegetable from the list   Fluids:        64-96oz per day     Recommended supplements:  Potassium as directed with breakfast and dinner (prescription)  Fiber capsules 1-2 times daily or sugar-free fiber powder (as tolerated)   Colace (stool softener) as needed, up to 4 capsules/day  Calcium: 500-600 mg, pill form twice daily with breakfast or lunch AND dinner  Multivitamin tablet or capsule per day that is low in iron (i.e. Centrum Silver)  Magnesium: 200-250 mg, pill form with dinner  or at bedtime  Bouillon cube one to two times per day as needed, if feeling dizzy   Beginning with the fourth morning of this diet, test urine every morning using Ketostix for ketone (fat) breakdown.     Liz Magdaleno

## 2024-07-12 ENCOUNTER — OFFICE VISIT (OUTPATIENT)
Dept: PRIMARY CARE | Facility: CLINIC | Age: 69
End: 2024-07-12
Payer: MEDICARE

## 2024-07-12 VITALS
WEIGHT: 162.2 LBS | HEART RATE: 83 BPM | SYSTOLIC BLOOD PRESSURE: 112 MMHG | BODY MASS INDEX: 27.69 KG/M2 | OXYGEN SATURATION: 92 % | DIASTOLIC BLOOD PRESSURE: 66 MMHG | HEIGHT: 64 IN | TEMPERATURE: 97.5 F

## 2024-07-12 DIAGNOSIS — R39.9 UTI SYMPTOMS: Primary | ICD-10-CM

## 2024-07-12 LAB
POC APPEARANCE, URINE: CLEAR
POC BILIRUBIN, URINE: NEGATIVE
POC BLOOD, URINE: NEGATIVE
POC COLOR, URINE: YELLOW
POC GLUCOSE, URINE: NEGATIVE MG/DL
POC KETONES, URINE: NEGATIVE MG/DL
POC LEUKOCYTES, URINE: ABNORMAL
POC NITRITE,URINE: NEGATIVE
POC PH, URINE: 8 PH
POC PROTEIN, URINE: ABNORMAL MG/DL
POC SPECIFIC GRAVITY, URINE: 1.01
POC UROBILINOGEN, URINE: 0.2 EU/DL

## 2024-07-12 PROCEDURE — 1160F RVW MEDS BY RX/DR IN RCRD: CPT | Performed by: FAMILY MEDICINE

## 2024-07-12 PROCEDURE — 1159F MED LIST DOCD IN RCRD: CPT | Performed by: FAMILY MEDICINE

## 2024-07-12 PROCEDURE — 87186 SC STD MICRODIL/AGAR DIL: CPT

## 2024-07-12 PROCEDURE — 1036F TOBACCO NON-USER: CPT | Performed by: FAMILY MEDICINE

## 2024-07-12 PROCEDURE — 3078F DIAST BP <80 MM HG: CPT | Performed by: FAMILY MEDICINE

## 2024-07-12 PROCEDURE — 81003 URINALYSIS AUTO W/O SCOPE: CPT | Performed by: FAMILY MEDICINE

## 2024-07-12 PROCEDURE — 1124F ACP DISCUSS-NO DSCNMKR DOCD: CPT | Performed by: FAMILY MEDICINE

## 2024-07-12 PROCEDURE — 87086 URINE CULTURE/COLONY COUNT: CPT

## 2024-07-12 PROCEDURE — 3074F SYST BP LT 130 MM HG: CPT | Performed by: FAMILY MEDICINE

## 2024-07-12 PROCEDURE — 99213 OFFICE O/P EST LOW 20 MIN: CPT | Performed by: FAMILY MEDICINE

## 2024-07-12 RX ORDER — NITROFURANTOIN 25; 75 MG/1; MG/1
100 CAPSULE ORAL 2 TIMES DAILY
Qty: 14 CAPSULE | Refills: 0 | Status: SHIPPED | OUTPATIENT
Start: 2024-07-12 | End: 2024-07-19

## 2024-07-12 ASSESSMENT — PATIENT HEALTH QUESTIONNAIRE - PHQ9
1. LITTLE INTEREST OR PLEASURE IN DOING THINGS: NOT AT ALL
SUM OF ALL RESPONSES TO PHQ9 QUESTIONS 1 AND 2: 0
2. FEELING DOWN, DEPRESSED OR HOPELESS: NOT AT ALL

## 2024-07-12 NOTE — PROGRESS NOTES
"Subjective   Patient ID: Zakiya Barrow is a 69 y.o. female who presents for UTI (Chills, throwing up, dry heaves. Dysuria infrequency).  2 weeks ago symptoms began  Urinary frequency nausea vomiting chills  Burning frequency no fever at this time  No blood in the urine  Dysuria and frequency the fever seems to be better        Review of Systems  Constitutional: no chills, no fever and no night sweats.   Eyes: no blurred vision and no eyesight problems.   ENT: no hearing loss, no nasal congestion, no nasal discharge, no hoarseness and no sore throat.   Cardiovascular: no chest pain, no intermittent leg claudication, no lower extremity edema, no palpitations and no syncope.   Respiratory: no cough, no shortness of breath during exertion, no shortness of breath at rest and no wheezing.   Gastrointestinal: no abdominal pain, no blood in stools, no constipation, no diarrhea, no melena, no nausea, no rectal pain and no vomiting.   Genitourinary: no dysuria, no change in urinary frequency, no urinary hesitancy, no feelings of urinary urgency and no vaginal discharge.   Musculoskeletal: no arthralgias,  no back pain and no myalgias.   Integumentary: no new skin lesions and no rashes.   Neurological: no difficulty walking, no headache, no limb weakness, no numbness and no tingling.   Psychiatric: no anxiety, no depression, no anhedonia and no substance use disorders.   Endocrine: no recent weight gain and no recent weight loss.   Hematologic/Lymphatic: no tendency for easy bruising and no swollen glands .    Objective    /66   Pulse 83   Temp 36.4 °C (97.5 °F)   Ht 1.626 m (5' 4\")   Wt 73.6 kg (162 lb 3.2 oz)   LMP  (LMP Unknown)   SpO2 92%   BMI 27.84 kg/m²    Physical Exam  The patient appeared well nourished and normally developed. Vital signs as documented. Head exam is unremarkable. No scleral icterus or corneal arcus noted.  Pupils are equal round reactive to light extraocular movements are intact no " hemorrhages noted on funduscopic exam mouth mucous membranes are moist no exudates ears canals clear TMs are gray pearly not injected nose no rhinorrhea or epistaxis Neck is without jugular venous distension, thyromegaly, or carotid bruits. Carotid upstrokes are brisk bilaterally. Lungs are clear to auscultation and percussion. Cardiac exam reveals the PMI to be normally sized and situated. Rhythm is regular. First and second heart sounds normal. No murmurs, rubs or gallops. Abdominal exam reveals normal bowel sounds, no masses, no organomegaly and no aortic enlargement. Extremities are nonedematous and both femoral and pedal pulses are normal.  Neurologic exam DTRs are equal bilaterally no focal deficits strength is symmetrical heme lymph no palpable lymph nodes in the neck axilla or groin   Back no CVA tenderness abdomen soft nontender nondistended minimal suprapubic tenderness  Assessment/Plan   Problem List Items Addressed This Visit       UTI symptoms - Primary    Relevant Medications    nitrofurantoin, macrocrystal-monohydrate, (Macrobid) 100 mg capsule    Other Relevant Orders    POCT UA Automated manually resulted (Completed)    Urine Culture (Completed)   Acute UTI  Begin antibiotic  Increase fluid intake  Further results pending culture         Miryam Lozoya MD

## 2024-07-14 LAB — BACTERIA UR CULT: ABNORMAL

## 2024-07-15 DIAGNOSIS — N30.00 ACUTE CYSTITIS WITHOUT HEMATURIA: Primary | ICD-10-CM

## 2024-07-15 LAB — BACTERIA UR CULT: ABNORMAL

## 2024-07-15 RX ORDER — CIPROFLOXACIN 500 MG/1
500 TABLET ORAL 2 TIMES DAILY
Qty: 20 TABLET | Refills: 0 | Status: SHIPPED | OUTPATIENT
Start: 2024-07-15 | End: 2024-07-25

## 2024-07-16 PROBLEM — R39.9 UTI SYMPTOMS: Status: ACTIVE | Noted: 2024-07-16

## 2024-07-18 ENCOUNTER — APPOINTMENT (OUTPATIENT)
Dept: ENDOCRINOLOGY | Facility: CLINIC | Age: 69
End: 2024-07-18
Payer: COMMERCIAL

## 2024-07-18 VITALS — BODY MASS INDEX: 27.98 KG/M2 | SYSTOLIC BLOOD PRESSURE: 120 MMHG | WEIGHT: 163 LBS | DIASTOLIC BLOOD PRESSURE: 80 MMHG

## 2024-07-18 DIAGNOSIS — E78.2 MIXED HYPERLIPIDEMIA: ICD-10-CM

## 2024-07-18 DIAGNOSIS — I10 BENIGN ESSENTIAL HYPERTENSION: ICD-10-CM

## 2024-07-18 DIAGNOSIS — R73.03 PREDIABETES: Primary | ICD-10-CM

## 2024-07-18 PROCEDURE — 3074F SYST BP LT 130 MM HG: CPT | Performed by: INTERNAL MEDICINE

## 2024-07-18 PROCEDURE — 99214 OFFICE O/P EST MOD 30 MIN: CPT | Performed by: INTERNAL MEDICINE

## 2024-07-18 PROCEDURE — 3079F DIAST BP 80-89 MM HG: CPT | Performed by: INTERNAL MEDICINE

## 2024-07-18 NOTE — PROGRESS NOTES
Patient ID: Zakiya Barrow is a 69 y.o. female who presents for Follow-up.  HPI  The patient comes in for follow up.    She has been off track of [facility] for management of insulin resistance with prediabetes hyperlipidemia hypertension fatigue GERD and lymphedema.    She has been following closer to the diet but not in 100%.    She has been under a lot of stress with her  who has Alzheimer's.    She just returned from [place] and is looking to get back on track.    Physically she has no other complaints.    ROS  Comprehensive review of systems is negative.    Objective   Physical Exam  Visit Vitals  /80      Vitals:    07/18/24 1135   Weight: 73.9 kg (163 lb)      Body mass index is 27.98 kg/m².      Weight 163 up 1 pound still down 3 but up 16 from her low    Eyes normal  ENT normal. No adenopathy  Thyroid palpable and normal. No nodules  Chest clear to auscultation  Heart sounds are normal  Abdomen nontender. Bowel sounds normal. No organomegaly  Feet are okay  Current Outpatient Medications   Medication Sig Dispense Refill    amLODIPine (Norvasc) 5 mg tablet Take 1 tablet (5 mg) by mouth once daily. as directed 90 tablet 3    atorvastatin (Lipitor) 40 mg tablet Take 1 tablet (40 mg) by mouth once daily. 90 tablet 3    ciprofloxacin (Cipro) 500 mg tablet Take 1 tablet (500 mg) by mouth 2 times a day for 10 days. 20 tablet 0    hydroCHLOROthiazide (HYDRODiuril) 50 mg tablet Take 1 tablet (50 mg) by mouth once daily. 90 tablet 3    meloxicam (Mobic) 7.5 mg tablet Take 1 tablet (7.5 mg) by mouth once daily. 30 tablet 2    methocarbamol (Robaxin) 500 mg tablet Take 1 tablet (500 mg) by mouth 4 times a day as needed for muscle spasms. 40 tablet 0    minoxidiL 5 % foam Apply 1 Application topically once daily. 60 g 0    nitrofurantoin, macrocrystal-monohydrate, (Macrobid) 100 mg capsule Take 1 capsule (100 mg) by mouth 2 times a day for 7 days. 14 capsule 0    potassium chloride ER (Micro-K) 10 mEq ER capsule  Take 1 capsule (10 mEq) by mouth 2 times a day. 180 capsule 3     No current facility-administered medications for this visit.       Assessment/Plan     1.  Insulin resistance  2.  Prediabetes  3.  Hypertension  4.  Hyperlipidemia    She is going to work to get back on track.  We discussed strategies for success. Planning ahead, taking things a week at a time and planning the week ahead of time.    She will work on exercise.    We again discussed weight loss drugs.    We also discussed metformin.    Follow up with the nutritionist in 1 month, me in 2 months, sooner as needed.         0 = swallows foods/liquids without difficulty

## 2024-07-24 ENCOUNTER — APPOINTMENT (OUTPATIENT)
Dept: DERMATOLOGY | Facility: CLINIC | Age: 69
End: 2024-07-24
Payer: COMMERCIAL

## 2024-07-24 ENCOUNTER — OFFICE VISIT (OUTPATIENT)
Dept: DERMATOLOGY | Facility: CLINIC | Age: 69
End: 2024-07-24
Payer: MEDICARE

## 2024-07-24 DIAGNOSIS — L90.5 SCAR CONDITIONS AND FIBROSIS OF SKIN: ICD-10-CM

## 2024-07-24 DIAGNOSIS — I89.0 LYMPHEDEMA: ICD-10-CM

## 2024-07-24 DIAGNOSIS — Z85.828 PERSONAL HISTORY OF OTHER MALIGNANT NEOPLASM OF SKIN: Primary | ICD-10-CM

## 2024-07-24 DIAGNOSIS — D22.9 MULTIPLE BENIGN MELANOCYTIC NEVI: ICD-10-CM

## 2024-07-24 DIAGNOSIS — L57.8 SUN-DAMAGED SKIN: ICD-10-CM

## 2024-07-24 PROCEDURE — 99214 OFFICE O/P EST MOD 30 MIN: CPT | Performed by: DERMATOLOGY

## 2024-07-24 PROCEDURE — 1159F MED LIST DOCD IN RCRD: CPT | Performed by: DERMATOLOGY

## 2024-07-24 ASSESSMENT — DERMATOLOGY PATIENT ASSESSMENT
DO YOU USE SUNSCREEN: DAILY
DO YOU HAVE ANY NEW OR CHANGING LESIONS: NO
HAVE YOU HAD OR DO YOU HAVE A STAPH INFECTION: NO
ARE YOU TRYING TO GET PREGNANT: NO
ARE YOU ON BIRTH CONTROL: NO
DO YOU HAVE IRREGULAR MENSTRUAL CYCLES: NO
DO YOU USE A TANNING BED: YES, PREVIOUSLY
HAVE YOU HAD OR DO YOU HAVE VASCULAR DISEASE: NO
ARE YOU AN ORGAN TRANSPLANT RECIPIENT: NO

## 2024-07-24 ASSESSMENT — DERMATOLOGY QUALITY OF LIFE (QOL) ASSESSMENT
ARE THERE EXCLUSIONS OR EXCEPTIONS FOR THE QUALITY OF LIFE ASSESSMENT: NO
DATE THE QUALITY-OF-LIFE ASSESSMENT WAS COMPLETED: 67045

## 2024-07-24 ASSESSMENT — PATIENT GLOBAL ASSESSMENT (PGA): PATIENT GLOBAL ASSESSMENT: PATIENT GLOBAL ASSESSMENT:  1 - CLEAR

## 2024-07-24 ASSESSMENT — ITCH NUMERIC RATING SCALE: HOW SEVERE IS YOUR ITCHING?: 0

## 2024-07-24 NOTE — PROGRESS NOTES
Subjective     Zakiya Barrow is a 69 y.o. female who presents for the following: Skin Check (Hx of melanoma, no spots of concern).     Review of Systems:  No other skin or systemic complaints other than what is documented elsewhere in the note.    The following portions of the chart were reviewed this encounter and updated as appropriate:         Skin Cancer History      Specialty Problems          Dermatology Problems    Personal history of malignant melanoma of skin     - History of stage T2b melanoma of right calf s/p WLE and indeterminate SLNB 2017, followed as stage IIIA by surgical oncology with lymph node surveillance, cleared for follow up in April 2022      Lesion 1: Nodular Basal Cell Carcinoma.  Deep margins involved.  Year Diagnosed:  2019. November.  Location:  Rt upper chest  Treatment(s): excision 12/2019  Pathology: [id number]     Lesion 2: Superficial Basal Cell Carcinoma.  Deep margins involved.  Year Diagnosed:  2020.  May.  Location:  Medial Upper Chest.  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    A full examination was performed including scalp, head, eyes, ears, nose, lips, neck, chest, axillae, abdomen, back, buttocks, bilateral upper extremities, bilateral lower extremities, hands, feet, fingers, toes, fingernails, and toenails. All findings within normal  limits unless otherwise noted below. No lymphadenopathy appreciated with examination of submental, submandibular, anterior cervical posterior cervical, supraclavicular, and axillary lymph nodes.       Assessment/Plan   1. 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 Lymphedema  Right Lower Leg - Anterior  2+ edema of right lower extremity     Lymphedema, secondary to sentinel lymph node removal   -Discussed the nature of the condition with patient. Patient currently wears compression sock, but continues to have swelling   -Will refer today to Dr. Paul, Vascular Medicine for further evaluation and management.     Related Procedures  Referral to Vascular Medicine    3. Multiple benign melanocytic nevi  - scattered regular brown macules and papules  -2 brown macules on left lateral foot           Benign melanocytic nevi  -Photo taken of 2 lesions on left lateral foot to monitor   - Discussed benign nature and that no treatment is necessary unless it becomes painful or increases in size. Patient opts for clinical monitoring at this time.    - Sun protective behavior reviewed and encouraged including the use of over-the-counter sunscreen with SPF30+ daily (reapply every 1.5 hours when outdoors), UPF clothing, broad rimmed hats, sunglasses, and avoidance of midday sun. Home skin monitoring encouraged and how to monitor for skin cancer (changing or new moles, new rapidly growing or non-healing lesions) reviewed. Patient encouraged to call with interval concerns or changes.      4. Sun-damaged skin  - scattered tan macules, telangiectasias, and general photo-damage    Actinically damaged skin-  - Sun protective behavior reviewed and encouraged  including the use of over-the-counter sunscreen with SPF30+ daily (reapply every 1.5 hours when outdoors), UPF clothing, broad rimmed hats, sunglasses, and avoidance of midday sun. Home skin monitoring encouraged and how to monitor for skin cancer (changing or new moles, new rapidly growing or non-healing lesions) reviewed. Patient encouraged to call with interval concerns or changes.      5. Scar conditions and fibrosis of skin  - Well healed scar at prior treatment sites without visual or palpable evidence of recurrence.     - Well healed scar(s) at sites of prior skin cancer and/or dysplastic nevi.  - Sun protective behavior reviewed and encouraged including the use of over-the-counter sunscreen with SPF30+ daily (reapply every 1.5 hours when outdoors), UPF clothing, broad rimmed hats, sunglasses, and avoidance of midday sun. Home skin monitoring encouraged and how to monitor for skin cancer (changing or new moles, new rapidly growing or non-healing lesions) reviewed. Patient encouraged to call with interval concerns or changes.         RTC in 3-4 months for MARIANA Lam DO   Dermatology Resident, PGY-3     I saw and evaluated the patient. I personally obtained the key and critical portions of the history and physical exam or was physically present for key and critical portions performed by the resident/fellow. I reviewed the resident/fellow's documentation and discussed the patient with the resident/fellow. I agree with the resident/fellow's medical decision making as documented in the note and made changes where appropriate.

## 2024-08-14 ENCOUNTER — APPOINTMENT (OUTPATIENT)
Dept: DERMATOLOGY | Facility: CLINIC | Age: 69
End: 2024-08-14
Payer: MEDICARE

## 2024-08-26 ENCOUNTER — APPOINTMENT (OUTPATIENT)
Dept: ENDOCRINOLOGY | Facility: CLINIC | Age: 69
End: 2024-08-26
Payer: MEDICARE

## 2024-09-23 ENCOUNTER — LAB (OUTPATIENT)
Dept: LAB | Facility: LAB | Age: 69
End: 2024-09-23
Payer: MEDICARE

## 2024-09-23 ENCOUNTER — APPOINTMENT (OUTPATIENT)
Dept: PRIMARY CARE | Facility: CLINIC | Age: 69
End: 2024-09-23
Payer: COMMERCIAL

## 2024-09-23 VITALS
HEIGHT: 65 IN | DIASTOLIC BLOOD PRESSURE: 81 MMHG | BODY MASS INDEX: 27.99 KG/M2 | HEART RATE: 100 BPM | OXYGEN SATURATION: 94 % | TEMPERATURE: 96.9 F | SYSTOLIC BLOOD PRESSURE: 136 MMHG | WEIGHT: 168 LBS

## 2024-09-23 DIAGNOSIS — R73.03 PREDIABETES: ICD-10-CM

## 2024-09-23 DIAGNOSIS — I10 PRIMARY HYPERTENSION: ICD-10-CM

## 2024-09-23 DIAGNOSIS — R51.9 CHRONIC DAILY HEADACHE: ICD-10-CM

## 2024-09-23 DIAGNOSIS — G45.9 TIA (TRANSIENT ISCHEMIC ATTACK): ICD-10-CM

## 2024-09-23 DIAGNOSIS — Z00.00 MEDICARE ANNUAL WELLNESS VISIT, SUBSEQUENT: Primary | ICD-10-CM

## 2024-09-23 DIAGNOSIS — E78.2 MIXED HYPERLIPIDEMIA: ICD-10-CM

## 2024-09-23 DIAGNOSIS — F40.240 CLAUSTROPHOBIA: ICD-10-CM

## 2024-09-23 DIAGNOSIS — H53.2 DIPLOPIA: ICD-10-CM

## 2024-09-23 PROBLEM — R39.9 UTI SYMPTOMS: Status: RESOLVED | Noted: 2024-07-16 | Resolved: 2024-09-23

## 2024-09-23 LAB
ALBUMIN SERPL BCP-MCNC: 4.8 G/DL (ref 3.4–5)
ALP SERPL-CCNC: 68 U/L (ref 33–136)
ALT SERPL W P-5'-P-CCNC: 26 U/L (ref 7–45)
ANION GAP SERPL CALC-SCNC: 15 MMOL/L (ref 10–20)
AST SERPL W P-5'-P-CCNC: 20 U/L (ref 9–39)
BASOPHILS # BLD AUTO: 0.05 X10*3/UL (ref 0–0.1)
BASOPHILS NFR BLD AUTO: 0.6 %
BILIRUB SERPL-MCNC: 0.8 MG/DL (ref 0–1.2)
BUN SERPL-MCNC: 22 MG/DL (ref 6–23)
CALCIUM SERPL-MCNC: 10 MG/DL (ref 8.6–10.6)
CHLORIDE SERPL-SCNC: 100 MMOL/L (ref 98–107)
CHOLEST SERPL-MCNC: 186 MG/DL (ref 0–199)
CHOLESTEROL/HDL RATIO: 3
CO2 SERPL-SCNC: 30 MMOL/L (ref 21–32)
CREAT SERPL-MCNC: 0.74 MG/DL (ref 0.5–1.05)
EGFRCR SERPLBLD CKD-EPI 2021: 88 ML/MIN/1.73M*2
EOSINOPHIL # BLD AUTO: 0.04 X10*3/UL (ref 0–0.7)
EOSINOPHIL NFR BLD AUTO: 0.5 %
ERYTHROCYTE [DISTWIDTH] IN BLOOD BY AUTOMATED COUNT: 13.1 % (ref 11.5–14.5)
EST. AVERAGE GLUCOSE BLD GHB EST-MCNC: 126 MG/DL
GLUCOSE SERPL-MCNC: 113 MG/DL (ref 74–99)
HBA1C MFR BLD: 6 %
HCT VFR BLD AUTO: 46.7 % (ref 36–46)
HDLC SERPL-MCNC: 61.6 MG/DL
HGB BLD-MCNC: 15.7 G/DL (ref 12–16)
IMM GRANULOCYTES # BLD AUTO: 0.05 X10*3/UL (ref 0–0.7)
IMM GRANULOCYTES NFR BLD AUTO: 0.6 % (ref 0–0.9)
LDLC SERPL CALC-MCNC: 97 MG/DL
LYMPHOCYTES # BLD AUTO: 1.98 X10*3/UL (ref 1.2–4.8)
LYMPHOCYTES NFR BLD AUTO: 24.9 %
MCH RBC QN AUTO: 32.1 PG (ref 26–34)
MCHC RBC AUTO-ENTMCNC: 33.6 G/DL (ref 32–36)
MCV RBC AUTO: 96 FL (ref 80–100)
MONOCYTES # BLD AUTO: 0.8 X10*3/UL (ref 0.1–1)
MONOCYTES NFR BLD AUTO: 10.1 %
NEUTROPHILS # BLD AUTO: 5.03 X10*3/UL (ref 1.2–7.7)
NEUTROPHILS NFR BLD AUTO: 63.3 %
NON HDL CHOLESTEROL: 124 MG/DL (ref 0–149)
NRBC BLD-RTO: 0 /100 WBCS (ref 0–0)
PLATELET # BLD AUTO: 305 X10*3/UL (ref 150–450)
POTASSIUM SERPL-SCNC: 3.8 MMOL/L (ref 3.5–5.3)
PROT SERPL-MCNC: 7.8 G/DL (ref 6.4–8.2)
RBC # BLD AUTO: 4.89 X10*6/UL (ref 4–5.2)
SODIUM SERPL-SCNC: 141 MMOL/L (ref 136–145)
TRIGL SERPL-MCNC: 138 MG/DL (ref 0–149)
VLDL: 28 MG/DL (ref 0–40)
WBC # BLD AUTO: 8 X10*3/UL (ref 4.4–11.3)

## 2024-09-23 PROCEDURE — 1159F MED LIST DOCD IN RCRD: CPT | Performed by: FAMILY MEDICINE

## 2024-09-23 PROCEDURE — 1160F RVW MEDS BY RX/DR IN RCRD: CPT | Performed by: FAMILY MEDICINE

## 2024-09-23 PROCEDURE — 3008F BODY MASS INDEX DOCD: CPT | Performed by: FAMILY MEDICINE

## 2024-09-23 PROCEDURE — 80061 LIPID PANEL: CPT

## 2024-09-23 PROCEDURE — 1123F ACP DISCUSS/DSCN MKR DOCD: CPT | Performed by: FAMILY MEDICINE

## 2024-09-23 PROCEDURE — 3075F SYST BP GE 130 - 139MM HG: CPT | Performed by: FAMILY MEDICINE

## 2024-09-23 PROCEDURE — 1158F ADVNC CARE PLAN TLK DOCD: CPT | Performed by: FAMILY MEDICINE

## 2024-09-23 PROCEDURE — G0439 PPPS, SUBSEQ VISIT: HCPCS | Performed by: FAMILY MEDICINE

## 2024-09-23 PROCEDURE — 3079F DIAST BP 80-89 MM HG: CPT | Performed by: FAMILY MEDICINE

## 2024-09-23 PROCEDURE — 36415 COLL VENOUS BLD VENIPUNCTURE: CPT

## 2024-09-23 PROCEDURE — 85025 COMPLETE CBC W/AUTO DIFF WBC: CPT

## 2024-09-23 PROCEDURE — 80053 COMPREHEN METABOLIC PANEL: CPT

## 2024-09-23 PROCEDURE — 1036F TOBACCO NON-USER: CPT | Performed by: FAMILY MEDICINE

## 2024-09-23 PROCEDURE — 99213 OFFICE O/P EST LOW 20 MIN: CPT | Performed by: FAMILY MEDICINE

## 2024-09-23 PROCEDURE — 83036 HEMOGLOBIN GLYCOSYLATED A1C: CPT

## 2024-09-23 NOTE — PROGRESS NOTES
Subjective   Reason for Visit: Zakiya Barrow is an 69 y.o. female here for a Medicare Wellness visit.               Annual Medicare wellness exam  Double vision and headache daily questionable TIA  Constant daily headache  Problems of lymphedema cramping in the calves                                 Patient Care Team:  Miryam Lozoya MD as PCP - General  Miryam Lozoya MD as PCP - [facility]ributed [facility]  Liz Magdaleno as [facility] ([facility])     Review of Systems  Constitutional: no chills, no fever and no night sweats.   Eyes: no blurred vision and no eyesight problems.   ENT: no hearing loss, no nasal congestion, no nasal discharge, no hoarseness and no sore throat.   Cardiovascular: no chest pain, no intermittent leg claudication, no lower extremity edema, no palpitations and no syncope.   Respiratory: no cough, no shortness of breath during exertion, no shortness of breath at rest and no wheezing.   Gastrointestinal: no abdominal pain, no blood in stools, no constipation, no diarrhea, no melena, no nausea, no rectal pain and no vomiting.   Genitourinary: no dysuria, no change in urinary frequency, no urinary hesitancy, no feelings of urinary urgency and no vaginal discharge.   Musculoskeletal: no arthralgias,  no back pain and no myalgias.   Integumentary: no new skin lesions and no rashes.   Neurological: no difficulty walking, no headache, no limb weakness, no numbness and no tingling.   Psychiatric: no anxiety, no depression, no anhedonia and no substance use disorders.   Endocrine: no recent weight gain and no recent weight loss.   Hematologic/Lymphatic: no tendency for easy bruising and no swollen glands .    Medicare Wellness Visit Billing Compliance Not Met    *This is a visual tool to show completion of required items on the day of the visit. Green checks will only appear on the date of visit.      Objective   Vitals:  /81  "  Pulse 100   Temp 36.1 °C (96.9 °F)   Ht 1.651 m (5' 5\")   Wt 76.2 kg (168 lb)   LMP  (LMP Unknown)   SpO2 94%   BMI 27.96 kg/m²       Physical Exam  The patient appeared well nourished and normally developed. Vital signs as documented. Head exam is unremarkable. No scleral icterus or corneal arcus noted.  Pupils are equal round reactive to light extraocular movements are intact no hemorrhages noted on funduscopic exam mouth mucous membranes are moist no exudates ears canals clear TMs are gray pearly not injected nose no rhinorrhea or epistaxis Neck is without jugular venous distension, thyromegaly, or carotid bruits. Carotid upstrokes are brisk bilaterally. Lungs are clear to auscultation and percussion. Cardiac exam reveals the PMI to be normally sized and situated. Rhythm is regular. First and second heart sounds normal. No murmurs, rubs or gallops. Abdominal exam reveals normal bowel sounds, no masses, no organomegaly and no aortic enlargement. Extremities are nonedematous and both femoral and pedal pulses are normal.  Neurologic exam DTRs are equal bilaterally no focal deficits strength is symmetrical heme lymph no palpable lymph nodes in the neck axilla or groin    Assessment/Plan   Problem List Items Addressed This Visit       Prediabetes    Relevant Orders    Hemoglobin A1C (Completed)    Medicare annual wellness visit, subsequent - Primary    Primary hypertension    Relevant Orders    Comprehensive metabolic panel (Completed)    CBC and Auto Differential (Completed)    Mixed hyperlipidemia    Relevant Orders    Lipid panel (Completed)    TIA (transient ischemic attack)    Relevant Orders    MR brain wo IV contrast    Chronic daily headache    Relevant Orders    MR brain wo IV contrast    Diplopia    Relevant Orders    MR brain wo IV contrast     Other Visit Diagnoses       Claustrophobia        Relevant Medications    diazePAM (Valium) 5 mg tablet               "

## 2024-09-24 ENCOUNTER — TELEPHONE (OUTPATIENT)
Dept: PRIMARY CARE | Facility: CLINIC | Age: 69
End: 2024-09-24
Payer: COMMERCIAL

## 2024-09-24 NOTE — TELEPHONE ENCOUNTER
----- Message from Miryam Lozoya sent at 9/23/2024 10:26 PM EDT -----  Her hemoglobin A1c is 6 which is consistant with prediabetes

## 2024-09-24 NOTE — RESULT ENCOUNTER NOTE
Please CALL patient  She is signed up for [employer] however has no access to a computer at this time  Her liver and kidney function are normal her electrolytes are normal her blood sugar is slightly elevated but not at diabetic levels  Her cholesterol levels are outstanding

## 2024-09-24 NOTE — TELEPHONE ENCOUNTER
----- Message from Miryam Lozoya sent at 9/23/2024  8:58 PM EDT -----   Please CALL patient  She is signed up for [employer] however has no access to a computer at this time  Her liver and kidney function are normal her electrolytes are normal her blood sugar is slightly elevated but not at diabetic levels  Her cholesterol levels are outstanding

## 2024-09-25 RX ORDER — DIAZEPAM 5 MG/1
TABLET ORAL
Qty: 1 TABLET | Refills: 0 | Status: SHIPPED | OUTPATIENT
Start: 2024-09-25

## 2024-09-29 PROBLEM — H53.2 DIPLOPIA: Status: ACTIVE | Noted: 2024-09-29

## 2024-09-30 ENCOUNTER — TELEPHONE (OUTPATIENT)
Dept: PRIMARY CARE | Facility: CLINIC | Age: 69
End: 2024-09-30
Payer: COMMERCIAL

## 2024-09-30 NOTE — TELEPHONE ENCOUNTER
Lm on  for return call. Patient has not been seen recently by Dr. Jarett Brar to discuss anxiety. Patient needs to schedule OV. Pt was wondering if she will need follow up appt to discuss MRI that was done on 9/27. She is still experiencing headaches and double vision. She uses [employer], so just send a message to her.

## 2024-10-01 NOTE — TELEPHONE ENCOUNTER
If the headaches do not improve she can come in and see me I think she is most likely having some migraines

## 2024-10-09 ENCOUNTER — APPOINTMENT (OUTPATIENT)
Dept: DERMATOLOGY | Facility: CLINIC | Age: 69
End: 2024-10-09
Payer: COMMERCIAL

## 2024-10-09 ENCOUNTER — OFFICE VISIT (OUTPATIENT)
Dept: CARDIOLOGY | Facility: CLINIC | Age: 69
End: 2024-10-09
Payer: MEDICARE

## 2024-10-09 VITALS
WEIGHT: 172.5 LBS | RESPIRATION RATE: 20 BRPM | BODY MASS INDEX: 28.74 KG/M2 | HEIGHT: 65 IN | DIASTOLIC BLOOD PRESSURE: 82 MMHG | SYSTOLIC BLOOD PRESSURE: 132 MMHG | HEART RATE: 75 BPM

## 2024-10-09 DIAGNOSIS — I89.0 LYMPHEDEMA: ICD-10-CM

## 2024-10-09 DIAGNOSIS — D22.9 MULTIPLE BENIGN MELANOCYTIC NEVI: Primary | ICD-10-CM

## 2024-10-09 DIAGNOSIS — L90.5 SCAR CONDITIONS AND FIBROSIS OF SKIN: ICD-10-CM

## 2024-10-09 DIAGNOSIS — Z85.828 PERSONAL HISTORY OF OTHER MALIGNANT NEOPLASM OF SKIN: ICD-10-CM

## 2024-10-09 DIAGNOSIS — L57.8 SUN-DAMAGED SKIN: ICD-10-CM

## 2024-10-09 PROCEDURE — 1036F TOBACCO NON-USER: CPT | Performed by: INTERNAL MEDICINE

## 2024-10-09 PROCEDURE — 1159F MED LIST DOCD IN RCRD: CPT | Performed by: INTERNAL MEDICINE

## 2024-10-09 PROCEDURE — 99203 OFFICE O/P NEW LOW 30 MIN: CPT | Performed by: INTERNAL MEDICINE

## 2024-10-09 PROCEDURE — 3079F DIAST BP 80-89 MM HG: CPT | Performed by: INTERNAL MEDICINE

## 2024-10-09 PROCEDURE — 1160F RVW MEDS BY RX/DR IN RCRD: CPT | Performed by: INTERNAL MEDICINE

## 2024-10-09 PROCEDURE — 1126F AMNT PAIN NOTED NONE PRSNT: CPT | Performed by: INTERNAL MEDICINE

## 2024-10-09 PROCEDURE — 1123F ACP DISCUSS/DSCN MKR DOCD: CPT | Performed by: INTERNAL MEDICINE

## 2024-10-09 PROCEDURE — 3008F BODY MASS INDEX DOCD: CPT | Performed by: INTERNAL MEDICINE

## 2024-10-09 PROCEDURE — 99213 OFFICE O/P EST LOW 20 MIN: CPT | Performed by: INTERNAL MEDICINE

## 2024-10-09 PROCEDURE — 3075F SYST BP GE 130 - 139MM HG: CPT | Performed by: INTERNAL MEDICINE

## 2024-10-09 ASSESSMENT — DERMATOLOGY QUALITY OF LIFE (QOL) ASSESSMENT
RATE HOW BOTHERED YOU ARE BY SYMPTOMS OF YOUR SKIN PROBLEM (EG, ITCHING, STINGING BURNING, HURTING OR SKIN IRRITATION): 0 - NEVER BOTHERED
DATE THE QUALITY-OF-LIFE ASSESSMENT WAS COMPLETED: 67122
RATE HOW EMOTIONALLY BOTHERED YOU ARE BY YOUR SKIN PROBLEM (FOR EXAMPLE, WORRY, EMBARRASSMENT, FRUSTRATION): 0 - NEVER BOTHERED
ARE THERE EXCLUSIONS OR EXCEPTIONS FOR THE QUALITY OF LIFE ASSESSMENT: NO
RATE HOW BOTHERED YOU ARE BY EFFECTS OF YOUR SKIN PROBLEMS ON YOUR ACTIVITIES (EG, GOING OUT, ACCOMPLISHING WHAT YOU WANT, WORK ACTIVITIES OR YOUR RELATIONSHIPS WITH OTHERS): 0 - NEVER BOTHERED

## 2024-10-09 ASSESSMENT — PATIENT HEALTH QUESTIONNAIRE - PHQ9
1. LITTLE INTEREST OR PLEASURE IN DOING THINGS: NOT AT ALL
1. LITTLE INTEREST OR PLEASURE IN DOING THINGS: NOT AT ALL
SUM OF ALL RESPONSES TO PHQ9 QUESTIONS 1 AND 2: 0
SUM OF ALL RESPONSES TO PHQ9 QUESTIONS 1 AND 2: 0
2. FEELING DOWN, DEPRESSED OR HOPELESS: NOT AT ALL
2. FEELING DOWN, DEPRESSED OR HOPELESS: NOT AT ALL

## 2024-10-09 ASSESSMENT — DERMATOLOGY PATIENT ASSESSMENT
DO YOU USE SUNSCREEN: DAILY
ARE YOU AN ORGAN TRANSPLANT RECIPIENT: NO
HAVE YOU HAD OR DO YOU HAVE VASCULAR DISEASE: NO
ARE YOU ON BIRTH CONTROL: NO
DO YOU HAVE ANY NEW OR CHANGING LESIONS: YES
DO YOU HAVE IRREGULAR MENSTRUAL CYCLES: NO
HAVE YOU HAD OR DO YOU HAVE A STAPH INFECTION: NO
DO YOU USE A TANNING BED: NO
ARE YOU TRYING TO GET PREGNANT: NO

## 2024-10-09 ASSESSMENT — PATIENT GLOBAL ASSESSMENT (PGA): PATIENT GLOBAL ASSESSMENT: PATIENT GLOBAL ASSESSMENT:  1 - CLEAR

## 2024-10-09 ASSESSMENT — PAIN SCALES - GENERAL: PAINLEVEL: 0-NO PAIN

## 2024-10-09 ASSESSMENT — ENCOUNTER SYMPTOMS
OCCASIONAL FEELINGS OF UNSTEADINESS: 1
DEPRESSION: 0
LOSS OF SENSATION IN FEET: 1

## 2024-10-09 ASSESSMENT — COLUMBIA-SUICIDE SEVERITY RATING SCALE - C-SSRS
1. IN THE PAST MONTH, HAVE YOU WISHED YOU WERE DEAD OR WISHED YOU COULD GO TO SLEEP AND NOT WAKE UP?: NO
2. HAVE YOU ACTUALLY HAD ANY THOUGHTS OF KILLING YOURSELF?: NO
2. HAVE YOU ACTUALLY HAD ANY THOUGHTS OF KILLING YOURSELF?: NO
6. HAVE YOU EVER DONE ANYTHING, STARTED TO DO ANYTHING, OR PREPARED TO DO ANYTHING TO END YOUR LIFE?: NO
1. IN THE PAST MONTH, HAVE YOU WISHED YOU WERE DEAD OR WISHED YOU COULD GO TO SLEEP AND NOT WAKE UP?: NO
6. HAVE YOU EVER DONE ANYTHING, STARTED TO DO ANYTHING, OR PREPARED TO DO ANYTHING TO END YOUR LIFE?: NO

## 2024-10-09 ASSESSMENT — ITCH NUMERIC RATING SCALE: HOW SEVERE IS YOUR ITCHING?: 0

## 2024-10-09 NOTE — PATIENT INSTRUCTIONS
ASSESSMENT/PLAN:  HERE FOR EVALUATION OF RLE SWELLING - STAGE 0 - DISCUSSED THERE IS MINIMAL SWELLING. STOCKING DONNING SHOULD BE FIRST THING IN THE MORNING AND OFF AT BEDTIME. DISCUSSED CAN ELEVATE AT NIGHT ON PARTICULARLY BAD DAYS.    FOLLOW UP BIJAN

## 2024-10-09 NOTE — PROGRESS NOTES
Subjective     Zakiya Barrow is a 69 y.o. female who presents for the following: Skin Check (FBsE, hx of melanoma, spot of concern on right thigh and forehead).     Review of Systems:  No other skin or systemic complaints other than what is documented elsewhere in the note.    The following portions of the chart were reviewed this encounter and updated as appropriate:         Skin Cancer History  No skin cancer on file.      Specialty Problems          Dermatology Problems    Actinic keratosis    Hemangioma of skin and subcutaneous tissue    Keloid    Lichen simplex chronicus    Melanocytic nevi of left upper limb, including shoulder    Melanocytic nevi of scalp and neck    Melanocytic nevi of trunk    Personal history of malignant melanoma of skin     - History of stage T2b melanoma of right calf s/p WLE and indeterminate SLNB 2017, followed as stage IIIA by surgical oncology with lymph node surveillance, cleared for follow up in April 2022      Lesion 1: Nodular Basal Cell Carcinoma.  Deep margins involved.  Year Diagnosed:  2019. November.  Location:  Rt upper chest  Treatment(s): excision 12/2019  Pathology: [id number]     Lesion 2: Superficial Basal Cell Carcinoma.  Deep margins involved.  Year Diagnosed:  2020.  May.  Location:  Medial Upper Chest.  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    A full examination was performed including scalp, head, eyes, ears, nose, lips, neck, chest, axillae, abdomen, back, bilateral upper extremities, bilateral lower extremities, hands, feet, fingers, toes,  fingernails, and toenails. Patient defers examination under bra or underwear. No inguinal or popliteal lymphadenopathy. All findings within normal limits unless otherwise noted below.    Assessment/Plan   1. Multiple benign melanocytic nevi  - scattered regular brown macules and papules  -2 brown macules on left lateral foot, 3mm and 1mm in width    Benign melanocytic nevi  -Monitored photo of 2 lesions on left lateral foot  - Discussed benign nature and that no treatment is necessary unless it becomes painful or increases in size. Patient opts for clinical monitoring at this time.    - Sun protective behavior reviewed and encouraged including the use of over-the-counter sunscreen with SPF30+ daily (reapply every 1.5 hours when outdoors), UPF clothing, broad rimmed hats, sunglasses, and avoidance of midday sun. Home skin monitoring encouraged and how to monitor for skin cancer (changing or new moles, new rapidly growing or non-healing lesions) reviewed. Patient encouraged to call with interval concerns or changes.      2. Personal history of other malignant neoplasm of skin  Well healed scar at site of prior melanoma     -History of stage T2b melanoma of right calf s/p WLE and indeterminate SLNB 2017  -No lymphadenopathy noted on exam today     Related Procedures  Follow Up In Dermatology  Follow Up In Dermatology - Established Patient    3. Sun-damaged skin  - scattered tan macules, telangiectasias, and general photo-damage    Actinically damaged skin-  - Sun protective behavior reviewed and encouraged including the use of over-the-counter sunscreen with SPF30+ daily (reapply every 1.5 hours when outdoors), UPF clothing, broad rimmed hats, sunglasses, and avoidance of midday sun. Home skin monitoring encouraged and how to monitor for skin cancer (changing or new moles, new rapidly growing or non-healing lesions) reviewed. Patient encouraged to call with interval concerns or changes.      4. Scar conditions and  fibrosis of skin  - Well healed scar at prior treatment sites without visual or palpable evidence of recurrence.     - Well healed scar(s) at sites of prior skin cancer and/or dysplastic nevi.  - Sun protective behavior reviewed and encouraged including the use of over-the-counter sunscreen with SPF30+ daily (reapply every 1.5 hours when outdoors), UPF clothing, broad rimmed hats, sunglasses, and avoidance of midday sun. Home skin monitoring encouraged and how to monitor for skin cancer (changing or new moles, new rapidly growing or non-healing lesions) reviewed. Patient encouraged to call with interval concerns or changes.       5. Lymphedema  Right Lower Leg - Anterior  2+ edema of right lower extremity     Lymphedema, secondary to sentinel lymph node removal   -Discussed the nature of the condition with patient. Patient currently wears compression sock, but continues to have swelling   -Following with Dr. Paul, Vascular Medicine for management, advised continuous use of compression stockings during the day and leg elevation in bed      Will continue FBSE every 6 months due to extensive sun damage history, skin cancer history and patient preference.       Marielle Shaikh MD  Department of Dermatology    I saw and evaluated the patient. I personally obtained the key and critical portions of the history and physical exam or was physically present for key and critical portions performed by the resident/fellow. I reviewed the resident/fellow's documentation and discussed the patient with the resident/fellow. I agree with the resident/fellow's medical decision making as documented in the note and made changes where appropriate.

## 2024-10-09 NOTE — PATIENT INSTRUCTIONS
Thank you for visiting with  Dermatology today!    At your visit today, we performed a skin check. The lesion on your leg was a benign spot called a seborrheic keratosis.    Going forward, we suggest the following:  - You should continue your sun protective habits. We recommend wearing sunscreen that is broad spectrum with SPF30+ daily (reapplication every 1.5 hours when outdoors), UPF clothing, broad rimmed hats, sunglasses, and avoidance of midday sun.

## 2024-10-09 NOTE — PROGRESS NOTES
OUTPATIENT CONSULTATION -  VASCULAR MEDICINE    DOS: 10/9/2024    REQUESTING PHYSICIAN:  Nathan Herrera MD,Dermotologist    REASON FOR CONSULT:  HERE FOR EVALUATION OF RLE SWELLING    HISTORY OF PRESENT ILLNESS:     70 y/o female HERE FOR EVALUATION OF RLE SWELLING. diagnosed with stage T2b melanoma of R calf s/p lymph node resection 12 years ago (2012) had surgery April 5, 2012 with open wound, closed completely by December year following. 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 REPORTS THE SWELLING STARTED ABOUT A YEAR AFTER THE SURGERY. SINCE STARING FEELS THIS IS WORSE. REPORTS SHE IS ON HER FEET MORE DURING THE DAY AND WHN ON HER FEET MORE - HAS MORE SWELLING. Swelling worsens with standing and while being on her feet. Notices increased swelling with walking. Has gained weight as a result. Previously tried using stationary bike and pool for exercise, but since stopped. Has put on 50# since 2012.  Swelling improves with compression stocking and elevation (with pillow underneath). Compression stockings every day, takes off at bedtime. Compression stockings are Jobst 20-30 mmHg. STATES DONS THIS AFTER SHOWERING. FEELS SHE HAS SWELLING DESPITE THE COMPRESSION. REPORTS HAS 1 PAIR PER YEAR. SLEEPS IN BED. FOOT OF THE BED IS NOT ELEVATED.     Denies h/o VTE. FH of VTE - Sister (provoked PE, following surgery) and dad both with h/o VTE.     DENIES LLE SWELLING. HAS ASSOCIATED SURAL NERVE NEUROPATHY. DENIES ASSOCIATED ITCHING, ACHING OR LEG FATIGUE.     PMH/PSH:    Hysterectomy  Cholecystectomy  PRE-DM  MELANOMA WITH EXCISION AND SENTINEL NODE BX  HTN  HPL    FAMILY HISTORY:     +VTE - SISTER AND FATHER AS NOTED  No CTD  No AAA    SOCIAL HISTORY:     Tobacco - never smoker  Employment - retired [employer]     REVIEW OF SYSTEMS:     No fevers, chills, night sweats, weight is stable  No sores, ulcers, rashes, skin lesions  +HA, SZ, syncope, stroke, TIA  No CP, palpitations or chest  "pressure  No cough, SOB  No ABD pain  No N/V/D/C  No BRBPR, melena, hematuria  No bleeding  + edema, no calf pain  No ambulatory leg pain  + parasthesias  UTD mammo, pap, colonoscopy    PHYSICAL EXAMINATION:   /82 (BP Location: Left arm, Patient Position: Sitting)   Pulse 75   Resp 20   Ht 1.651 m (5' 5\")   Wt 78.2 kg (172 lb 8 oz)   LMP  (LMP Unknown)   BMI 28.71 kg/m²     Gen: Appears well, NAD  HEENT: WNL  No carotid bruits  Chest: CTA  CVS: regular without murmur or gallop  Ext: no SIG edema, nontender  NEG STEMMER SIGN  Skin: good condition without wounds or lesions  Pulses: DP 2+; PT 2+  Neuro: grossly normal, CN intact, NIRAJ x 4  Mood and affect appropriate    ADDITIONAL DATA:   No compression worn today. Right calf: 37.0cm  Left calf:  38.0cm    ASSESSMENT/PLAN:  HERE FOR EVALUATION OF RLE SWELLING - STAGE 0 - DISCUSSED THERE IS MINIMAL SWELLING. STOCKING DONNING SHOULD BE FIRST THING IN THE MORNING AND OFF AT BEDTIME. DISCUSSED CAN ELEVATE AT NIGHT ON PARTICULARLY BAD DAYS.    FOLLOW UP JUNE "

## 2024-11-01 ENCOUNTER — APPOINTMENT (OUTPATIENT)
Dept: ENDOCRINOLOGY | Facility: CLINIC | Age: 69
End: 2024-11-01
Payer: COMMERCIAL

## 2024-11-01 VITALS — WEIGHT: 169 LBS | DIASTOLIC BLOOD PRESSURE: 74 MMHG | SYSTOLIC BLOOD PRESSURE: 126 MMHG | BODY MASS INDEX: 28.12 KG/M2

## 2024-11-01 DIAGNOSIS — R73.03 PREDIABETES: Primary | ICD-10-CM

## 2024-11-01 DIAGNOSIS — I10 BENIGN ESSENTIAL HYPERTENSION: ICD-10-CM

## 2024-11-01 DIAGNOSIS — E78.2 MIXED HYPERLIPIDEMIA: ICD-10-CM

## 2024-11-01 PROCEDURE — 99214 OFFICE O/P EST MOD 30 MIN: CPT | Performed by: INTERNAL MEDICINE

## 2024-11-01 PROCEDURE — 1123F ACP DISCUSS/DSCN MKR DOCD: CPT | Performed by: INTERNAL MEDICINE

## 2024-11-01 PROCEDURE — 3074F SYST BP LT 130 MM HG: CPT | Performed by: INTERNAL MEDICINE

## 2024-11-01 PROCEDURE — 3078F DIAST BP <80 MM HG: CPT | Performed by: INTERNAL MEDICINE

## 2024-11-01 RX ORDER — POTASSIUM CHLORIDE 750 MG/1
10 CAPSULE, EXTENDED RELEASE ORAL 2 TIMES DAILY
Qty: 180 CAPSULE | Refills: 3 | Status: SHIPPED | OUTPATIENT
Start: 2024-11-01 | End: 2025-11-01

## 2024-12-28 DIAGNOSIS — J01.90 ACUTE NON-RECURRENT SINUSITIS, UNSPECIFIED LOCATION: Primary | ICD-10-CM

## 2024-12-28 RX ORDER — AZITHROMYCIN 250 MG/1
TABLET, FILM COATED ORAL
Qty: 6 TABLET | Refills: 0 | Status: SHIPPED | OUTPATIENT
Start: 2024-12-28 | End: 2025-01-02

## 2025-01-06 ENCOUNTER — OFFICE VISIT (OUTPATIENT)
Dept: PRIMARY CARE | Facility: CLINIC | Age: 70
End: 2025-01-06
Payer: MEDICARE

## 2025-01-06 VITALS
HEART RATE: 109 BPM | DIASTOLIC BLOOD PRESSURE: 72 MMHG | TEMPERATURE: 97.3 F | OXYGEN SATURATION: 96 % | SYSTOLIC BLOOD PRESSURE: 127 MMHG

## 2025-01-06 DIAGNOSIS — R68.89 FLU-LIKE SYMPTOMS: Primary | ICD-10-CM

## 2025-01-06 DIAGNOSIS — J01.90 ACUTE NON-RECURRENT SINUSITIS, UNSPECIFIED LOCATION: Primary | ICD-10-CM

## 2025-01-06 LAB
POC BINAX EXPIRATION: NORMAL
POC BINAX NOW COVID SERIAL NUMBER: NORMAL
POC RAPID INFLUENZA A: NEGATIVE
POC RAPID INFLUENZA B: NEGATIVE
POC SARS-COV-2 AG BINAX: NORMAL

## 2025-01-06 PROCEDURE — 1159F MED LIST DOCD IN RCRD: CPT | Performed by: FAMILY MEDICINE

## 2025-01-06 PROCEDURE — 87811 SARS-COV-2 COVID19 W/OPTIC: CPT | Performed by: FAMILY MEDICINE

## 2025-01-06 PROCEDURE — 99213 OFFICE O/P EST LOW 20 MIN: CPT | Performed by: FAMILY MEDICINE

## 2025-01-06 PROCEDURE — 87804 INFLUENZA ASSAY W/OPTIC: CPT | Performed by: FAMILY MEDICINE

## 2025-01-06 PROCEDURE — 3078F DIAST BP <80 MM HG: CPT | Performed by: FAMILY MEDICINE

## 2025-01-06 PROCEDURE — 1126F AMNT PAIN NOTED NONE PRSNT: CPT | Performed by: FAMILY MEDICINE

## 2025-01-06 PROCEDURE — 3074F SYST BP LT 130 MM HG: CPT | Performed by: FAMILY MEDICINE

## 2025-01-06 PROCEDURE — 1160F RVW MEDS BY RX/DR IN RCRD: CPT | Performed by: FAMILY MEDICINE

## 2025-01-06 PROCEDURE — 1123F ACP DISCUSS/DSCN MKR DOCD: CPT | Performed by: FAMILY MEDICINE

## 2025-01-06 PROCEDURE — 1158F ADVNC CARE PLAN TLK DOCD: CPT | Performed by: FAMILY MEDICINE

## 2025-01-06 RX ORDER — DOXYCYCLINE 100 MG/1
100 CAPSULE ORAL 2 TIMES DAILY
Qty: 28 CAPSULE | Refills: 0 | Status: SHIPPED | OUTPATIENT
Start: 2025-01-06 | End: 2025-01-20

## 2025-01-06 RX ORDER — AZITHROMYCIN 250 MG/1
TABLET, FILM COATED ORAL
Qty: 6 TABLET | Refills: 0 | Status: SHIPPED | OUTPATIENT
Start: 2025-01-06 | End: 2025-01-11

## 2025-01-06 ASSESSMENT — PAIN SCALES - GENERAL: PAINLEVEL_OUTOF10: 0-NO PAIN

## 2025-01-06 NOTE — PROGRESS NOTES
Subjective   Patient ID: Zakiya Barrow is a 69 y.o. female who presents for Flu Symptoms.  Very pleasant 69-year-old flulike symptoms  Dry heaves nausea diarrhea  Initially had fever  Cough congestion upper respiratory symptoms  Has been going on for several days   has similar symptoms  Has body aches as well no rash no seizures no mental status changes  No blood in the stool          Review of Systems  Constitutional: no chills, no fever and no night sweats.   Eyes: no blurred vision and no eyesight problems.   ENT: no hearing loss, no nasal congestion, no nasal discharge, no hoarseness and no sore throat.   Cardiovascular: no chest pain, no intermittent leg claudication, no lower extremity edema, no palpitations and no syncope.   Respiratory: no cough, no shortness of breath during exertion, no shortness of breath at rest and no wheezing.   Gastrointestinal: no abdominal pain, no blood in stools, no constipation, no diarrhea, no melena, no nausea, no rectal pain and no vomiting.   Genitourinary: no dysuria, no change in urinary frequency, no urinary hesitancy, no feelings of urinary urgency and no vaginal discharge.   Musculoskeletal: no arthralgias,  no back pain and no myalgias.   Integumentary: no new skin lesions and no rashes.   Neurological: no difficulty walking, no headache, no limb weakness, no numbness and no tingling.   Psychiatric: no anxiety, no depression, no anhedonia and no substance use disorders.   Endocrine: no recent weight gain and no recent weight loss.   Hematologic/Lymphatic: no tendency for easy bruising and no swollen glands .    Objective    /72 (Patient Position: Sitting)   Pulse 109   Temp 36.3 °C (97.3 °F) (Temporal)   LMP  (LMP Unknown)   SpO2 96%    Physical Exam  The patient appeared well nourished and normally developed. Vital signs as documented. Head exam is unremarkable. No scleral icterus or corneal arcus noted.  Pupils are equal round reactive to light  extraocular movements are intact no hemorrhages noted on funduscopic exam mouth mucous membranes are moist no exudates ears canals clear TMs are gray pearly not injected nose no rhinorrhea or epistaxis Neck is without jugular venous distension, thyromegaly, or carotid bruits. Carotid upstrokes are brisk bilaterally. Lungs are clear to auscultation and percussion. Cardiac exam reveals the PMI to be normally sized and situated. Rhythm is regular. First and second heart sounds normal. No murmurs, rubs or gallops. Abdominal exam reveals normal bowel sounds, no masses, no organomegaly and no aortic enlargement. Extremities are nonedematous and both femoral and pedal pulses are normal.  Neurologic exam DTRs are equal bilaterally no focal deficits strength is symmetrical heme lymph no palpable lymph nodes in the neck axilla or groin pain lungs clear no areas of consolidation Harsh cough no accessory muscle use nose congestion pressure over maxillary sinuses oropharynx noninjected no retractions patient no accessory muscle use     Assessment/Plan   Problem List Items Addressed This Visit       Flu-like symptoms - Primary     Covid and flu negative  Most likely bronchial or URI  Post viral sinus   Doxycyline 100mg   CXR if no improvement           Relevant Orders    POCT BinaxNOW Covid-19 Ag Card manually resulted (Completed)    POCT Influenza A/B manually resulted (Completed)   Flulike symptoms  Predominantly respiratory  No evidence of dehydration on exam  Treat conservatively with doxycycline follow-up if no improvement         Miryam Lozoya MD

## 2025-01-10 ENCOUNTER — TELEPHONE (OUTPATIENT)
Dept: PRIMARY CARE | Facility: CLINIC | Age: 70
End: 2025-01-10
Payer: COMMERCIAL

## 2025-01-10 NOTE — TELEPHONE ENCOUNTER
Patient called x 2 on 1/6/2025.  First message stating that her prescription was not sent in.  I checked with Dr. Lozoya she states the prescription was sent.  Patient called again stating the prescription that was sent was something she could potentially be allergic to.  She has an allergy to tetracycline and the prescription was for doxycycline so she did not take it.  She states that she did ask the pharmacy about this and they said they were related but they still took her money for it and she left the pharmacy.  Looked over her chart she was recently on Zithromax but patient has multiple medication allergies we had a long discussion about options and she opted to try the Zithromax again.  Rx was called in and I advised her to take the doxycycline back to the pharmacy and request a refund

## 2025-01-24 NOTE — ASSESSMENT & PLAN NOTE
Covid and flu negative  Most likely bronchial or URI  Post viral sinus   Doxycyline 100mg   CXR if no improvement

## 2025-01-27 ENCOUNTER — OFFICE VISIT (OUTPATIENT)
Dept: PRIMARY CARE | Facility: CLINIC | Age: 70
End: 2025-01-27
Payer: COMMERCIAL

## 2025-01-27 VITALS
BODY MASS INDEX: 29.32 KG/M2 | HEART RATE: 91 BPM | WEIGHT: 176 LBS | TEMPERATURE: 97.7 F | SYSTOLIC BLOOD PRESSURE: 130 MMHG | DIASTOLIC BLOOD PRESSURE: 82 MMHG | OXYGEN SATURATION: 93 % | HEIGHT: 65 IN

## 2025-01-27 DIAGNOSIS — J01.00 ACUTE NON-RECURRENT MAXILLARY SINUSITIS: Primary | ICD-10-CM

## 2025-01-27 PROCEDURE — 3079F DIAST BP 80-89 MM HG: CPT | Performed by: FAMILY MEDICINE

## 2025-01-27 PROCEDURE — 1158F ADVNC CARE PLAN TLK DOCD: CPT | Performed by: FAMILY MEDICINE

## 2025-01-27 PROCEDURE — 1160F RVW MEDS BY RX/DR IN RCRD: CPT | Performed by: FAMILY MEDICINE

## 2025-01-27 PROCEDURE — 3008F BODY MASS INDEX DOCD: CPT | Performed by: FAMILY MEDICINE

## 2025-01-27 PROCEDURE — 1036F TOBACCO NON-USER: CPT | Performed by: FAMILY MEDICINE

## 2025-01-27 PROCEDURE — 1123F ACP DISCUSS/DSCN MKR DOCD: CPT | Performed by: FAMILY MEDICINE

## 2025-01-27 PROCEDURE — 1159F MED LIST DOCD IN RCRD: CPT | Performed by: FAMILY MEDICINE

## 2025-01-27 PROCEDURE — 99213 OFFICE O/P EST LOW 20 MIN: CPT | Performed by: FAMILY MEDICINE

## 2025-01-27 PROCEDURE — 3075F SYST BP GE 130 - 139MM HG: CPT | Performed by: FAMILY MEDICINE

## 2025-01-27 RX ORDER — METHYLPREDNISOLONE 4 MG/1
TABLET ORAL
Qty: 21 TABLET | Refills: 0 | Status: SHIPPED | OUTPATIENT
Start: 2025-01-27 | End: 2025-02-02

## 2025-01-27 RX ORDER — AZITHROMYCIN 250 MG/1
TABLET, FILM COATED ORAL
Qty: 6 TABLET | Refills: 1 | Status: SHIPPED | OUTPATIENT
Start: 2025-01-27 | End: 2025-02-01

## 2025-01-27 NOTE — PROGRESS NOTES
"Subjective   Patient ID: Zakiya Barrow is a 69 y.o. female who presents for Sinus Problem (Pt is here for sinus infection ).  Very pleasant 69-year-old  Sinus symptoms  Is been going on for over a week  Pain in the sinuses postnasal drip cough and congestion no fever        Review of Systems  Constitutional: no chills, no fever and no night sweats.   Eyes: no blurred vision and no eyesight problems.   ENT: no hearing loss, no nasal congestion, no nasal discharge, no hoarseness and no sore throat.   Cardiovascular: no chest pain, no intermittent leg claudication, no lower extremity edema, no palpitations and no syncope.   Respiratory: no cough, no shortness of breath during exertion, no shortness of breath at rest and no wheezing.   Gastrointestinal: no abdominal pain, no blood in stools, no constipation, no diarrhea, no melena, no nausea, no rectal pain and no vomiting.   Genitourinary: no dysuria, no change in urinary frequency, no urinary hesitancy, no feelings of urinary urgency and no vaginal discharge.   Musculoskeletal: no arthralgias,  no back pain and no myalgias.   Integumentary: no new skin lesions and no rashes.   Neurological: no difficulty walking, no headache, no limb weakness, no numbness and no tingling.   Psychiatric: no anxiety, no depression, no anhedonia and no substance use disorders.   Endocrine: no recent weight gain and no recent weight loss.   Hematologic/Lymphatic: no tendency for easy bruising and no swollen glands .    Objective    /82   Pulse 91   Temp 36.5 °C (97.7 °F)   Ht 1.651 m (5' 5\")   Wt 79.8 kg (176 lb)   LMP  (LMP Unknown)   SpO2 93%   BMI 29.29 kg/m²    Physical Exam  The patient appeared well nourished and normally developed. Vital signs as documented. Head exam is unremarkable. No scleral icterus or corneal arcus noted.  Pupils are equal round reactive to light extraocular movements are intact no hemorrhages noted on funduscopic exam mouth mucous membranes " are moist no exudates ears canals clear TMs are gray pearly not injected nose no rhinorrhea or epistaxis Neck is without jugular venous distension, thyromegaly, or carotid bruits. Carotid upstrokes are brisk bilaterally. Lungs are clear to auscultation and percussion. Cardiac exam reveals the PMI to be normally sized and situated. Rhythm is regular. First and second heart sounds normal. No murmurs, rubs or gallops. Abdominal exam reveals normal bowel sounds, no masses, no organomegaly and no aortic enlargement. Extremities are nonedematous and both femoral and pedal pulses are normal.  Neurologic exam DTRs are equal bilaterally no focal deficits strength is symmetrical heme lymph no palpable lymph nodes in the neck axilla or groin pain and pressure over the sinuses postnasal drip oropharynx noninjected neck supple    Assessment/Plan   Problem List Items Addressed This Visit       Acute non-recurrent maxillary sinusitis - Primary    Relevant Medications    azithromycin (Zithromax) 250 mg tablet    methylPREDNISolone (Medrol Dospak) 4 mg tablets            Miryam Lozoya MD

## 2025-01-30 ENCOUNTER — TELEPHONE (OUTPATIENT)
Dept: PRIMARY CARE | Facility: CLINIC | Age: 70
End: 2025-01-30
Payer: COMMERCIAL

## 2025-01-30 DIAGNOSIS — J20.9 BRONCHITIS WITH BRONCHOSPASM: Primary | ICD-10-CM

## 2025-01-30 RX ORDER — BROMPHENIRAMINE MALEATE, PSEUDOEPHEDRINE HYDROCHLORIDE, AND DEXTROMETHORPHAN HYDROBROMIDE 2; 30; 10 MG/5ML; MG/5ML; MG/5ML
5 SYRUP ORAL 4 TIMES DAILY PRN
Qty: 120 ML | Refills: 0 | Status: SHIPPED | OUTPATIENT
Start: 2025-01-30 | End: 2025-02-09

## 2025-01-30 NOTE — TELEPHONE ENCOUNTER
Pt has a deep cough, has been sick with cold since Christmas Eve. She has one days worth of cough med Bromphenir pseudoephed-DM syrup left, please refill.

## 2025-02-01 PROBLEM — J01.00 ACUTE NON-RECURRENT MAXILLARY SINUSITIS: Status: ACTIVE | Noted: 2025-02-01

## 2025-02-02 ENCOUNTER — HOSPITAL ENCOUNTER (EMERGENCY)
Facility: HOSPITAL | Age: 70
Discharge: LEFT BEFORE DISCHARGE | End: 2025-02-02
Payer: COMMERCIAL

## 2025-02-02 VITALS
WEIGHT: 170 LBS | BODY MASS INDEX: 29.02 KG/M2 | DIASTOLIC BLOOD PRESSURE: 88 MMHG | OXYGEN SATURATION: 95 % | HEART RATE: 115 BPM | SYSTOLIC BLOOD PRESSURE: 180 MMHG | RESPIRATION RATE: 18 BRPM | TEMPERATURE: 96.8 F | HEIGHT: 64 IN

## 2025-02-02 PROCEDURE — 4500999001 HC ED NO CHARGE

## 2025-02-02 ASSESSMENT — COLUMBIA-SUICIDE SEVERITY RATING SCALE - C-SSRS
6. HAVE YOU EVER DONE ANYTHING, STARTED TO DO ANYTHING, OR PREPARED TO DO ANYTHING TO END YOUR LIFE?: NO
1. IN THE PAST MONTH, HAVE YOU WISHED YOU WERE DEAD OR WISHED YOU COULD GO TO SLEEP AND NOT WAKE UP?: NO
2. HAVE YOU ACTUALLY HAD ANY THOUGHTS OF KILLING YOURSELF?: NO

## 2025-02-02 ASSESSMENT — LIFESTYLE VARIABLES
EVER HAD A DRINK FIRST THING IN THE MORNING TO STEADY YOUR NERVES TO GET RID OF A HANGOVER: NO
HAVE PEOPLE ANNOYED YOU BY CRITICIZING YOUR DRINKING: NO
TOTAL SCORE: 0
EVER FELT BAD OR GUILTY ABOUT YOUR DRINKING: NO
HAVE YOU EVER FELT YOU SHOULD CUT DOWN ON YOUR DRINKING: NO

## 2025-02-02 ASSESSMENT — PAIN SCALES - GENERAL: PAINLEVEL_OUTOF10: 0 - NO PAIN

## 2025-02-02 ASSESSMENT — PAIN - FUNCTIONAL ASSESSMENT: PAIN_FUNCTIONAL_ASSESSMENT: 0-10

## 2025-02-05 ENCOUNTER — OFFICE VISIT (OUTPATIENT)
Dept: PRIMARY CARE | Facility: CLINIC | Age: 70
End: 2025-02-05
Payer: MEDICARE

## 2025-02-05 VITALS
WEIGHT: 173 LBS | BODY MASS INDEX: 29.53 KG/M2 | SYSTOLIC BLOOD PRESSURE: 122 MMHG | RESPIRATION RATE: 18 BRPM | OXYGEN SATURATION: 98 % | DIASTOLIC BLOOD PRESSURE: 68 MMHG | HEIGHT: 64 IN | TEMPERATURE: 97.6 F | HEART RATE: 97 BPM

## 2025-02-05 DIAGNOSIS — R00.0 TACHYCARDIA: Primary | ICD-10-CM

## 2025-02-05 PROCEDURE — 1158F ADVNC CARE PLAN TLK DOCD: CPT | Performed by: FAMILY MEDICINE

## 2025-02-05 PROCEDURE — 1159F MED LIST DOCD IN RCRD: CPT | Performed by: FAMILY MEDICINE

## 2025-02-05 PROCEDURE — 3078F DIAST BP <80 MM HG: CPT | Performed by: FAMILY MEDICINE

## 2025-02-05 PROCEDURE — 99214 OFFICE O/P EST MOD 30 MIN: CPT | Performed by: FAMILY MEDICINE

## 2025-02-05 PROCEDURE — 1123F ACP DISCUSS/DSCN MKR DOCD: CPT | Performed by: FAMILY MEDICINE

## 2025-02-05 PROCEDURE — 1160F RVW MEDS BY RX/DR IN RCRD: CPT | Performed by: FAMILY MEDICINE

## 2025-02-05 PROCEDURE — 1036F TOBACCO NON-USER: CPT | Performed by: FAMILY MEDICINE

## 2025-02-05 PROCEDURE — 3074F SYST BP LT 130 MM HG: CPT | Performed by: FAMILY MEDICINE

## 2025-02-05 PROCEDURE — 3008F BODY MASS INDEX DOCD: CPT | Performed by: FAMILY MEDICINE

## 2025-02-05 ASSESSMENT — ENCOUNTER SYMPTOMS
OCCASIONAL FEELINGS OF UNSTEADINESS: 0
LOSS OF SENSATION IN FEET: 1
DEPRESSION: 0

## 2025-02-05 NOTE — PROGRESS NOTES
"Subjective   Patient ID: Zakiya Barrow is a 69 y.o. female who presents for Follow-up.  HPI    Review of Systems  Constitutional: no chills, no fever and no night sweats.   Eyes: no blurred vision and no eyesight problems.   ENT: no hearing loss, no nasal congestion, no nasal discharge, no hoarseness and no sore throat.   Cardiovascular: no chest pain, no intermittent leg claudication, no lower extremity edema, no palpitations and no syncope.   Respiratory: no cough, no shortness of breath during exertion, no shortness of breath at rest and no wheezing.   Gastrointestinal: no abdominal pain, no blood in stools, no constipation, no diarrhea, no melena, no nausea, no rectal pain and no vomiting.   Genitourinary: no dysuria, no change in urinary frequency, no urinary hesitancy, no feelings of urinary urgency and no vaginal discharge.   Musculoskeletal: no arthralgias,  no back pain and no myalgias.   Integumentary: no new skin lesions and no rashes.   Neurological: no difficulty walking, no headache, no limb weakness, no numbness and no tingling.   Psychiatric: no anxiety, no depression, no anhedonia and no substance use disorders.   Endocrine: no recent weight gain and no recent weight loss.   Hematologic/Lymphatic: no tendency for easy bruising and no swollen glands .    Objective    /68   Pulse 97   Temp 36.4 °C (97.6 °F) (Temporal)   Resp 18   Ht 1.626 m (5' 4\")   Wt 78.5 kg (173 lb)   LMP  (LMP Unknown)   SpO2 98%   BMI 29.70 kg/m²    Physical Exam  The patient appeared well nourished and normally developed. Vital signs as documented. Head exam is unremarkable. No scleral icterus or corneal arcus noted.  Pupils are equal round reactive to light extraocular movements are intact no hemorrhages noted on funduscopic exam mouth mucous membranes are moist no exudates ears canals clear TMs are gray pearly not injected nose no rhinorrhea or epistaxis Neck is without jugular venous distension, " thyromegaly, or carotid bruits. Carotid upstrokes are brisk bilaterally. Lungs are clear to auscultation and percussion. Cardiac exam reveals the PMI to be normally sized and situated. Rhythm is regular. First and second heart sounds normal. No murmurs, rubs or gallops. Abdominal exam reveals normal bowel sounds, no masses, no organomegaly and no aortic enlargement. Extremities are nonedematous and both femoral and pedal pulses are normal.  Neurologic exam DTRs are equal bilaterally no focal deficits strength is symmetrical heme lymph no palpable lymph nodes in the neck axilla or groin    Assessment/Plan   Problem List Items Addressed This Visit    None  Visit Diagnoses       Tachycardia    -  Primary    Relevant Orders    Holter or Event Cardiac Monitor                 Miryam Lozoya MD

## 2025-02-25 PROBLEM — R00.0 TACHYCARDIA: Status: ACTIVE | Noted: 2025-02-25

## 2025-02-25 PROBLEM — R94.31 ABNORMAL EKG: Status: ACTIVE | Noted: 2025-02-02

## 2025-02-26 ENCOUNTER — APPOINTMENT (OUTPATIENT)
Dept: ENDOCRINOLOGY | Facility: CLINIC | Age: 70
End: 2025-02-26
Payer: COMMERCIAL

## 2025-02-26 NOTE — ASSESSMENT & PLAN NOTE
Episodic tachycardia  Avoid caffeine  TSH and CBC normal  Holter event monitor  Follow-up after monitor complete

## 2025-03-18 ENCOUNTER — APPOINTMENT (OUTPATIENT)
Dept: ENDOCRINOLOGY | Facility: CLINIC | Age: 70
End: 2025-03-18
Payer: MEDICARE

## 2025-03-24 ENCOUNTER — APPOINTMENT (OUTPATIENT)
Dept: PRIMARY CARE | Facility: CLINIC | Age: 70
End: 2025-03-24
Payer: COMMERCIAL

## 2025-03-24 VITALS
OXYGEN SATURATION: 93 % | DIASTOLIC BLOOD PRESSURE: 82 MMHG | BODY MASS INDEX: 30.48 KG/M2 | WEIGHT: 177.6 LBS | HEART RATE: 105 BPM | TEMPERATURE: 97.7 F | SYSTOLIC BLOOD PRESSURE: 120 MMHG

## 2025-03-24 DIAGNOSIS — Z12.31 ENCOUNTER FOR SCREENING MAMMOGRAM FOR BREAST CANCER: ICD-10-CM

## 2025-03-24 DIAGNOSIS — R73.03 PREDIABETES: ICD-10-CM

## 2025-03-24 DIAGNOSIS — E78.2 MIXED HYPERLIPIDEMIA: Primary | ICD-10-CM

## 2025-03-24 DIAGNOSIS — I10 PRIMARY HYPERTENSION: ICD-10-CM

## 2025-03-24 PROCEDURE — 1123F ACP DISCUSS/DSCN MKR DOCD: CPT | Performed by: FAMILY MEDICINE

## 2025-03-24 PROCEDURE — 3074F SYST BP LT 130 MM HG: CPT | Performed by: FAMILY MEDICINE

## 2025-03-24 PROCEDURE — 1159F MED LIST DOCD IN RCRD: CPT | Performed by: FAMILY MEDICINE

## 2025-03-24 PROCEDURE — 1160F RVW MEDS BY RX/DR IN RCRD: CPT | Performed by: FAMILY MEDICINE

## 2025-03-24 PROCEDURE — 3079F DIAST BP 80-89 MM HG: CPT | Performed by: FAMILY MEDICINE

## 2025-03-24 PROCEDURE — 99213 OFFICE O/P EST LOW 20 MIN: CPT | Performed by: FAMILY MEDICINE

## 2025-03-24 PROCEDURE — 1158F ADVNC CARE PLAN TLK DOCD: CPT | Performed by: FAMILY MEDICINE

## 2025-03-24 ASSESSMENT — PATIENT HEALTH QUESTIONNAIRE - PHQ9
2. FEELING DOWN, DEPRESSED OR HOPELESS: NOT AT ALL
1. LITTLE INTEREST OR PLEASURE IN DOING THINGS: NOT AT ALL
SUM OF ALL RESPONSES TO PHQ9 QUESTIONS 1 AND 2: 0

## 2025-03-24 ASSESSMENT — ENCOUNTER SYMPTOMS: LOSS OF SENSATION IN FEET: 1

## 2025-03-24 NOTE — PROGRESS NOTES
Subjective   Patient ID: Zakiya Barrow is a 69 y.o. female who presents for Follow-up (6 mo follow up).  Very pleasant 69-year-old with hypertension hyperlipidemia here today for follow-up for her heart monitor and being ill she had had a significant cough since the beginning of the year was felt to have an influenza that was not diagnosed she is doing significantly better still has a slight cough but it is intermittent and not as often as previously patient is not a smoker  Had significant tachycardia that she went to ER was when she was extremely ill she is doing much better is wearing a heart monitor now that she would like me to remove she feels significantly better however did see the cardiologist had an evaluation done and this monitor was ordered she has not had any tachycardia or tremors since has not felt lightheaded or dizzy she is very frustrated by weight loss she had been doing a program and doing very well but is starting to put weight back on        Review of Systems  Constitutional: no chills, no fever and no night sweats.   Eyes: no blurred vision and no eyesight problems.   ENT: no hearing loss, no nasal congestion, no nasal discharge, no hoarseness and no sore throat.   Cardiovascular: no chest pain, no intermittent leg claudication, no lower extremity edema, no palpitations and no syncope.   Respiratory: no cough, no shortness of breath during exertion, no shortness of breath at rest and no wheezing.   Gastrointestinal: no abdominal pain, no blood in stools, no constipation, no diarrhea, no melena, no nausea, no rectal pain and no vomiting.   Genitourinary: no dysuria, no change in urinary frequency, no urinary hesitancy, no feelings of urinary urgency and no vaginal discharge.   Musculoskeletal: no arthralgias,  no back pain and no myalgias.   Integumentary: no new skin lesions and no rashes.   Neurological: no difficulty walking, no headache, no limb weakness, no numbness and no tingling.    Psychiatric: no anxiety, no depression, no anhedonia and no substance use disorders.   Endocrine: no recent weight gain and no recent weight loss.   Hematologic/Lymphatic: no tendency for easy bruising and no swollen glands .    Objective    /82   Pulse 105   Temp 36.5 °C (97.7 °F)   Wt 80.6 kg (177 lb 9.6 oz)   LMP  (LMP Unknown)   SpO2 93%   BMI 30.48 kg/m²    Physical Exam  The patient appeared well nourished and normally developed. Vital signs as documented. Head exam is unremarkable. No scleral icterus or corneal arcus noted.  Pupils are equal round reactive to light extraocular movements are intact no hemorrhages noted on funduscopic exam mouth mucous membranes are moist no exudates ears canals clear TMs are gray pearly not injected nose no rhinorrhea or epistaxis Neck is without jugular venous distension, thyromegaly, or carotid bruits. Carotid upstrokes are brisk bilaterally. Lungs are clear to auscultation and percussion. Cardiac exam reveals the PMI to be normally sized and situated. Rhythm is regular. First and second heart sounds normal. No murmurs, rubs or gallops. Abdominal exam reveals normal bowel sounds, no masses, no organomegaly and no aortic enlargement. Extremities are nonedematous and both femoral and pedal pulses are normal.  Neurologic exam DTRs are equal bilaterally no focal deficits strength is symmetrical heme lymph no palpable lymph nodes in the neck axilla or groin    Assessment/Plan   Problem List Items Addressed This Visit       Prediabetes    Relevant Orders    Hemoglobin A1C    Primary hypertension    Relevant Orders    Comprehensive metabolic panel    Mixed hyperlipidemia - Primary    Relevant Orders    Lipid panel     Other Visit Diagnoses       Encounter for screening mammogram for breast cancer        Relevant Orders    BI mammo bilateral screening tomosynthesis                 Miryam Lozoya MD

## 2025-03-25 LAB
ALBUMIN SERPL-MCNC: 4.7 G/DL (ref 3.6–5.1)
ALP SERPL-CCNC: 53 U/L (ref 37–153)
ALT SERPL-CCNC: 39 U/L (ref 6–29)
ANION GAP SERPL CALCULATED.4IONS-SCNC: 11 MMOL/L (CALC) (ref 7–17)
AST SERPL-CCNC: 23 U/L (ref 10–35)
BILIRUB SERPL-MCNC: 0.6 MG/DL (ref 0.2–1.2)
BUN SERPL-MCNC: 21 MG/DL (ref 7–25)
CALCIUM SERPL-MCNC: 9.8 MG/DL (ref 8.6–10.4)
CHLORIDE SERPL-SCNC: 102 MMOL/L (ref 98–110)
CHOLEST SERPL-MCNC: 166 MG/DL
CHOLEST/HDLC SERPL: 3.2 (CALC)
CO2 SERPL-SCNC: 28 MMOL/L (ref 20–32)
CREAT SERPL-MCNC: 0.77 MG/DL (ref 0.5–1.05)
EGFRCR SERPLBLD CKD-EPI 2021: 84 ML/MIN/1.73M2
EST. AVERAGE GLUCOSE BLD GHB EST-MCNC: 140 MG/DL
EST. AVERAGE GLUCOSE BLD GHB EST-SCNC: 7.7 MMOL/L
GLUCOSE SERPL-MCNC: 120 MG/DL (ref 65–99)
HBA1C MFR BLD: 6.5 % OF TOTAL HGB
HDLC SERPL-MCNC: 52 MG/DL
LDLC SERPL CALC-MCNC: 86 MG/DL (CALC)
NONHDLC SERPL-MCNC: 114 MG/DL (CALC)
POTASSIUM SERPL-SCNC: 4 MMOL/L (ref 3.5–5.3)
PROT SERPL-MCNC: 7.5 G/DL (ref 6.1–8.1)
SODIUM SERPL-SCNC: 141 MMOL/L (ref 135–146)
TRIGL SERPL-MCNC: 185 MG/DL

## 2025-03-28 DIAGNOSIS — I10 BENIGN ESSENTIAL HYPERTENSION: ICD-10-CM

## 2025-03-28 RX ORDER — AMLODIPINE BESYLATE 5 MG/1
5 TABLET ORAL DAILY
Qty: 90 TABLET | Refills: 3 | Status: SHIPPED | OUTPATIENT
Start: 2025-03-28

## 2025-04-09 ENCOUNTER — APPOINTMENT (OUTPATIENT)
Dept: PRIMARY CARE | Facility: CLINIC | Age: 70
End: 2025-04-09
Payer: COMMERCIAL

## 2025-04-09 VITALS
WEIGHT: 178.8 LBS | DIASTOLIC BLOOD PRESSURE: 82 MMHG | SYSTOLIC BLOOD PRESSURE: 134 MMHG | HEART RATE: 112 BPM | BODY MASS INDEX: 30.52 KG/M2 | HEIGHT: 64 IN | TEMPERATURE: 98.5 F

## 2025-04-09 DIAGNOSIS — E11.65 TYPE 2 DIABETES MELLITUS WITH HYPERGLYCEMIA, WITHOUT LONG-TERM CURRENT USE OF INSULIN: Primary | ICD-10-CM

## 2025-04-09 DIAGNOSIS — R11.0 CHRONIC NAUSEA: ICD-10-CM

## 2025-04-09 PROCEDURE — 1036F TOBACCO NON-USER: CPT | Performed by: FAMILY MEDICINE

## 2025-04-09 PROCEDURE — 3079F DIAST BP 80-89 MM HG: CPT | Performed by: FAMILY MEDICINE

## 2025-04-09 PROCEDURE — 3075F SYST BP GE 130 - 139MM HG: CPT | Performed by: FAMILY MEDICINE

## 2025-04-09 PROCEDURE — 3008F BODY MASS INDEX DOCD: CPT | Performed by: FAMILY MEDICINE

## 2025-04-09 PROCEDURE — 1160F RVW MEDS BY RX/DR IN RCRD: CPT | Performed by: FAMILY MEDICINE

## 2025-04-09 PROCEDURE — 1123F ACP DISCUSS/DSCN MKR DOCD: CPT | Performed by: FAMILY MEDICINE

## 2025-04-09 PROCEDURE — 99213 OFFICE O/P EST LOW 20 MIN: CPT | Performed by: FAMILY MEDICINE

## 2025-04-09 PROCEDURE — 1158F ADVNC CARE PLAN TLK DOCD: CPT | Performed by: FAMILY MEDICINE

## 2025-04-09 PROCEDURE — 1159F MED LIST DOCD IN RCRD: CPT | Performed by: FAMILY MEDICINE

## 2025-04-09 RX ORDER — ONDANSETRON 4 MG/1
4 TABLET, ORALLY DISINTEGRATING ORAL EVERY 8 HOURS PRN
Qty: 20 TABLET | Refills: 0 | Status: SHIPPED | OUTPATIENT
Start: 2025-04-09 | End: 2025-04-27

## 2025-04-09 ASSESSMENT — PATIENT HEALTH QUESTIONNAIRE - PHQ9
SUM OF ALL RESPONSES TO PHQ9 QUESTIONS 1 AND 2: 0
1. LITTLE INTEREST OR PLEASURE IN DOING THINGS: NOT AT ALL
2. FEELING DOWN, DEPRESSED OR HOPELESS: NOT AT ALL

## 2025-04-09 NOTE — PROGRESS NOTES
Subjective   Patient ID: Zakiya Barrow is a 70 y.o. female who presents for Follow-up.  Very pleasant 70-year-old with history of hypertension hyperlipidemia and prediabetes had blood work which revealed her hemoglobin A1c was 6.7 she is here today to follow-up and discuss that and discuss treatment of diabetes going forward she has chronic nausea from vertigo and headaches and would like a refill on her ondansetron she has a family history of diabetes she is struggled to lose weight but has been working very hard at it all working exercising has not had any chest pain or shortness of breath no episodes of polydipsia or polyuria or heat or cold intolerance        Review of Systems  Constitutional: no chills, no fever and no night sweats.   Eyes: no blurred vision and no eyesight problems.   ENT: no hearing loss, no nasal congestion, no nasal discharge, no hoarseness and no sore throat.   Cardiovascular: no chest pain, no intermittent leg claudication, no lower extremity edema, no palpitations and no syncope.   Respiratory: no cough, no shortness of breath during exertion, no shortness of breath at rest and no wheezing.   Gastrointestinal: no abdominal pain, no blood in stools, no constipation, no diarrhea, no melena, no nausea, no rectal pain and no vomiting.   Genitourinary: no dysuria, no change in urinary frequency, no urinary hesitancy, no feelings of urinary urgency and no vaginal discharge.   Musculoskeletal: no arthralgias,  no back pain and no myalgias.   Integumentary: no new skin lesions and no rashes.   Neurological: no difficulty walking, no headache, no limb weakness, no numbness and no tingling.   Psychiatric: no anxiety, no depression, no anhedonia and no substance use disorders.   Endocrine: no recent weight gain and no recent weight loss.   Hematologic/Lymphatic: no tendency for easy bruising and no swollen glands .    Objective    /82   Pulse (!) 112   Temp 36.9 °C (98.5 °F)   Ht 1.626  "m (5' 4\")   Wt 81.1 kg (178 lb 12.8 oz)   LMP  (LMP Unknown)   BMI 30.69 kg/m²    Physical Exam  The patient appeared well nourished and normally developed. Vital signs as documented. Head exam is unremarkable. No scleral icterus or corneal arcus noted.  Pupils are equal round reactive to light extraocular movements are intact no hemorrhages noted on funduscopic exam mouth mucous membranes are moist no exudates ears canals clear TMs are gray pearly not injected nose no rhinorrhea or epistaxis Neck is without jugular venous distension, thyromegaly, or carotid bruits. Carotid upstrokes are brisk bilaterally. Lungs are clear to auscultation and percussion. Cardiac exam reveals the PMI to be normally sized and situated. Rhythm is regular. First and second heart sounds normal. No murmurs, rubs or gallops. Abdominal exam reveals normal bowel sounds, no masses, no organomegaly and no aortic enlargement. Extremities are nonedematous and both femoral and pedal pulses are normal.  Neurologic exam DTRs are equal bilaterally no focal deficits strength is symmetrical heme lymph no palpable lymph nodes in the neck axilla or groin  Degenerative changes in the joints  Assessment/Plan   Problem List Items Addressed This Visit       Type 2 diabetes mellitus with hyperglycemia, without long-term current use of insulin - Primary    To onset of type 2 diabetes  Diabetic diet reviewed  Begin semaglutide  Follow-up in 3 months         Relevant Medications    semaglutide 0.25 mg or 0.5 mg (2 mg/3 mL) pen injector    Other Relevant Orders    Albumin-Creatinine Ratio, Urine Random    Chronic nausea    Relevant Medications    ondansetron ODT (Zofran-ODT) 4 mg disintegrating tablet            Miryam Lozoya MD  "

## 2025-04-14 ENCOUNTER — APPOINTMENT (OUTPATIENT)
Dept: ENDOCRINOLOGY | Facility: CLINIC | Age: 70
End: 2025-04-14
Payer: MEDICARE

## 2025-04-15 ENCOUNTER — OFFICE VISIT (OUTPATIENT)
Dept: ENDOCRINOLOGY | Facility: CLINIC | Age: 70
End: 2025-04-15
Payer: MEDICARE

## 2025-04-15 VITALS — DIASTOLIC BLOOD PRESSURE: 78 MMHG | WEIGHT: 175 LBS | SYSTOLIC BLOOD PRESSURE: 126 MMHG | BODY MASS INDEX: 30.04 KG/M2

## 2025-04-15 DIAGNOSIS — E78.2 MIXED HYPERLIPIDEMIA: ICD-10-CM

## 2025-04-15 DIAGNOSIS — I10 BENIGN ESSENTIAL HYPERTENSION: ICD-10-CM

## 2025-04-15 DIAGNOSIS — E11.65 TYPE 2 DIABETES MELLITUS WITH HYPERGLYCEMIA, WITHOUT LONG-TERM CURRENT USE OF INSULIN: Primary | ICD-10-CM

## 2025-04-15 PROCEDURE — 3078F DIAST BP <80 MM HG: CPT | Performed by: INTERNAL MEDICINE

## 2025-04-15 PROCEDURE — 3074F SYST BP LT 130 MM HG: CPT | Performed by: INTERNAL MEDICINE

## 2025-04-15 PROCEDURE — 99214 OFFICE O/P EST MOD 30 MIN: CPT | Performed by: INTERNAL MEDICINE

## 2025-04-15 PROCEDURE — 1123F ACP DISCUSS/DSCN MKR DOCD: CPT | Performed by: INTERNAL MEDICINE

## 2025-04-15 NOTE — PROGRESS NOTES
Patient ID: Zakiya Barrow is a 70 y.o. female who presents for Follow-up.  HPI  The patient comes in for follow up.    She has been off track of [facility] for management of insulin resistance with prediabetes hyperlipidemia hypertension fatigue GERD and lymphedema.    She states that she has been struggling with motivation over the last 10 years since being diagnosed with cancer.    She was sick December through February was hospitalized on 2 occasions had steroids on 3 occasions and antibiotics.    She then had a hemoglobin A1c that was 6.5%.    She was now started on Ozempic 0.25 mg and she is taken 1 dose.    Physically she has no other complaints.    ROS  Comprehensive review of systems is negative.    Objective   Physical Exam  Visit Vitals  /78      Vitals:    04/15/25 1203   Weight: 79.4 kg (175 lb)      Body mass index is 30.04 kg/m².      Weight 175 up 6 pounds    ENT normal. No adenopathy  Fundi normal  Thyroid palpable and normal. No nodules  Chest clear to auscultation  Heart sounds are normal  Abdomen nontender. Bowel sounds normal. No organomegaly  Feet are okay  Normal vibration and monofilament sensation normal pulses, no lesions    Current Outpatient Medications   Medication Sig Dispense Refill    amLODIPine (Norvasc) 5 mg tablet TAKE 1 TABLET BY MOUTH ONCE DAILY AS DIRECTED 90 tablet 3    atorvastatin (Lipitor) 40 mg tablet Take 1 tablet (40 mg) by mouth once daily. 90 tablet 3    diazePAM (Valium) 5 mg tablet Take one tablet one hour prior to MRI (Patient not taking: Reported on 4/9/2025) 1 tablet 0    hydroCHLOROthiazide (HYDRODiuril) 50 mg tablet Take 1 tablet (50 mg) by mouth once daily. 90 tablet 3    methocarbamol (Robaxin) 500 mg tablet Take 1 tablet (500 mg) by mouth 4 times a day as needed for muscle spasms. (Patient not taking: Reported on 4/9/2025) 40 tablet 0    minoxidiL 5 % foam Apply 1 Application topically once daily. (Patient not taking: Reported on 4/9/2025) 60 g 0     ondansetron ODT (Zofran-ODT) 4 mg disintegrating tablet Dissolve 1 tablet (4 mg) in the mouth every 8 hours if needed for nausea or vomiting for up to 7 days. 20 tablet 0    potassium chloride ER (Micro-K) 10 mEq ER capsule Take 1 capsule (10 mEq) by mouth 2 times a day. 180 capsule 3    semaglutide 0.25 mg or 0.5 mg (2 mg/3 mL) pen injector Inject 0.5 mg under the skin 1 (one) time per week. Patient has first month sample 6 mL 3     No current facility-administered medications for this visit.       Assessment/Plan     1.  Type 2 diabetes  2.  Hypertension  3.  Hyperlipidemia    We discussed the diagnosis of diabetes.    We discussed the benefits of Ozempic.    She will work up with the dose if tolerated.    She will work on diet and exercise.  She is going to try a Mediterranean diet.    She will follow-up with me in 3 months sooner as needed.

## 2025-04-16 ENCOUNTER — APPOINTMENT (OUTPATIENT)
Dept: DERMATOLOGY | Facility: CLINIC | Age: 70
End: 2025-04-16
Payer: COMMERCIAL

## 2025-04-16 ENCOUNTER — TELEPHONE (OUTPATIENT)
Dept: DERMATOLOGY | Facility: CLINIC | Age: 70
End: 2025-04-16

## 2025-04-16 DIAGNOSIS — L81.4 LENTIGO: ICD-10-CM

## 2025-04-16 DIAGNOSIS — D48.5 NEOPLASM OF UNCERTAIN BEHAVIOR OF SKIN: ICD-10-CM

## 2025-04-16 DIAGNOSIS — Z85.828 PERSONAL HISTORY OF OTHER MALIGNANT NEOPLASM OF SKIN: ICD-10-CM

## 2025-04-16 DIAGNOSIS — Z85.820 PERSONAL HISTORY OF MALIGNANT MELANOMA OF SKIN: ICD-10-CM

## 2025-04-16 DIAGNOSIS — L82.1 SEBORRHEIC KERATOSIS: ICD-10-CM

## 2025-04-16 DIAGNOSIS — D22.9 MULTIPLE BENIGN MELANOCYTIC NEVI: ICD-10-CM

## 2025-04-16 DIAGNOSIS — L57.0 ACTINIC KERATOSIS: ICD-10-CM

## 2025-04-16 DIAGNOSIS — I89.0 LYMPHEDEMA: ICD-10-CM

## 2025-04-16 DIAGNOSIS — L90.5 SCAR CONDITIONS AND FIBROSIS OF SKIN: ICD-10-CM

## 2025-04-16 DIAGNOSIS — L57.8 SUN-DAMAGED SKIN: ICD-10-CM

## 2025-04-16 DIAGNOSIS — C43.9 MELANOMA OF SKIN (MULTI): Primary | ICD-10-CM

## 2025-04-16 ASSESSMENT — PATIENT GLOBAL ASSESSMENT (PGA): PATIENT GLOBAL ASSESSMENT: PATIENT GLOBAL ASSESSMENT:  1 - CLEAR

## 2025-04-16 ASSESSMENT — DERMATOLOGY QUALITY OF LIFE (QOL) ASSESSMENT
RATE HOW BOTHERED YOU ARE BY EFFECTS OF YOUR SKIN PROBLEMS ON YOUR ACTIVITIES (EG, GOING OUT, ACCOMPLISHING WHAT YOU WANT, WORK ACTIVITIES OR YOUR RELATIONSHIPS WITH OTHERS): 0 - NEVER BOTHERED
RATE HOW BOTHERED YOU ARE BY SYMPTOMS OF YOUR SKIN PROBLEM (EG, ITCHING, STINGING BURNING, HURTING OR SKIN IRRITATION): 0 - NEVER BOTHERED
DATE THE QUALITY-OF-LIFE ASSESSMENT WAS COMPLETED: 67311
RATE HOW EMOTIONALLY BOTHERED YOU ARE BY YOUR SKIN PROBLEM (FOR EXAMPLE, WORRY, EMBARRASSMENT, FRUSTRATION): 0 - NEVER BOTHERED
ARE THERE EXCLUSIONS OR EXCEPTIONS FOR THE QUALITY OF LIFE ASSESSMENT: NO

## 2025-04-16 ASSESSMENT — DERMATOLOGY PATIENT ASSESSMENT: DO YOU HAVE ANY NEW OR CHANGING LESIONS: YES

## 2025-04-16 ASSESSMENT — ITCH NUMERIC RATING SCALE: HOW SEVERE IS YOUR ITCHING?: 0

## 2025-04-16 NOTE — Clinical Note
Benign melanocytic nevi  -Monitored photo of 2 lesions on left lateral foot  - Discussed benign nature and that no treatment is necessary unless it becomes painful or increases in size. Patient opts for clinical monitoring at this time.    - Sun protective behavior reviewed and encouraged including the use of over-the-counter sunscreen with SPF30+ daily (reapply every 1.5 hours when outdoors), UPF clothing, broad rimmed hats, sunglasses, and avoidance of midday sun. Home skin monitoring encouraged and how to monitor for skin cancer (changing or new moles, new rapidly growing or non-healing lesions) reviewed. Patient encouraged to call with interval concerns or changes.

## 2025-04-16 NOTE — Clinical Note
-History of stage T2b melanoma of right calf s/p WLE and indeterminate SLNB 2017  -No lymphadenopathy noted on exam today

## 2025-04-16 NOTE — Clinical Note
Lymphedema, secondary to sentinel lymph node removal   -Discussed the nature of the condition with patient. Patient currently wears compression sock, but continues to have swelling   -Following with Dr. Paul, Vascular Medicine for management, advised continuous use of compression stockings during the day and leg elevation in bed

## 2025-04-16 NOTE — Clinical Note
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

## 2025-04-16 NOTE — Clinical Note
- scattered regular brown macules and papules  -2 brown macules on left lateral foot, 3mm and 1mm in width

## 2025-04-16 NOTE — Clinical Note
- Well healed scar(s) at sites of prior skin cancer and/or dysplastic nevi.  - Sun protective behavior reviewed and encouraged including the use of over-the-counter sunscreen with SPF30+ daily (reapply every 1.5 hours when outdoors), UPF clothing, broad rimmed hats, sunglasses, and avoidance of midday sun. Home skin monitoring encouraged and how to monitor for skin cancer (changing or new moles, new rapidly growing or non-healing lesions) reviewed. Patient encouraged to call with interval concerns or changes.

## 2025-04-16 NOTE — PATIENT INSTRUCTIONS
We saw you today for your skin check! You had some pre-skin cancers that were frozen. We also took two biopsies [facility] Herrera will message you with the results.     You can get another skin check with Dr. Sr in 3 months!   We will see you back in 6 months!    Actinic Keratosis    What is an actinic keratosis?  An actinic keratosis (plural: actinic keratoses) is growth on the surface of the skin that usually appears as a red, hard, crusty or scaly bump.     What causes actinic keratoses?  Repeated prolonged sun exposure causes skin damage, especially in fair-skinned persons. Sun-damaged skin becomes dry and wrinkled and may form rough, scaly spots called actinic keratoses. These rough spots remain on the skin even though the crust or scale on top is picked off.     Why treat actinic keratoses?  Actinic keratoses are not skin cancers, but because they may sometimes turn cancerous they are called “pre-cancerous”. Not all will turn to skin cancer, and it usually takes several years for this to happen. Because it is much easier to treat an actinic keratosis then it is to remove a skin cancer, actinic keratoses should be treated to prevent future skin cancer.     How are actinic keratoses treated?  The most common way of treating actinic keratoses is to freeze them with liquid nitrogen. Freezing causes scabbing and shedding of the sun-damaged skin. Healing after a removal usually takes two weeks, depending on the size and location of the keratosis. Hands and legs heal more slowly than the face. The skin’s final appearance is usually excellent. There are several topical medications that can be used to treat actinic keratoses. These medications generally have side effects of redness, crusting, and pain. Some are used for a few days, and some for several months before the actinic keratosis is completely gone. Photodynamic therapy is another alternative to freezing actinic  keratoses. This treatment is done in a physician’s office. A medication is applied to the area of skin with actinic keratoses, and it is allowed to soak in for one or more hours. A special light is then applied to the skin. Side effects include redness, burning, and peeling.    How can you prevent actinic keratoses?  Protection from the sun is the best way to prevent actinic keratoses. The use of proper clothing and sunscreens can prevent the sun damage that leads to an actinic keratosis.  Unfortunately, some sun damage is permanent. Once sun damage has progressed to the point where actinic keratoses develop, new keratoses may appear even without further sun exposure. However, even in skin that is already heavily sun damaged, good sun protection can help reduce the number of actinic keratoses that will appear.

## 2025-04-16 NOTE — Clinical Note
- Benign sun-related nature of disorder reviewed. Need for strict UV protection to prevent darkening and additional UV damage reviewed.

## 2025-04-16 NOTE — Clinical Note
Actinically damaged skin-  - Sun protective behavior reviewed and encouraged including the use of over-the-counter sunscreen with SPF30+ daily (reapply every 1.5 hours when outdoors), UPF clothing, broad rimmed hats, sunglasses, and avoidance of midday sun. Home skin monitoring encouraged and how to monitor for skin cancer (changing or new moles, new rapidly growing or non-healing lesions) reviewed. Patient encouraged to call with interval concerns or changes.

## 2025-04-16 NOTE — Clinical Note
- Discussed benign nature and that no treatment is necessary unless it becomes painful or increases in size. Patient opts for clinical monitoring at this time.

## 2025-04-16 NOTE — PROGRESS NOTES
Subjective     Zakiya Barrow is a 70 y.o. female who presents for the following: Skin Check and Suspicious Skin Lesion (Rt Thigh, Rt Flank, Lt Arm).     She reports lesions of concerns on the left postauricular neck, bilateral shoulders, dorsal nose, and leg. She denies any other changing, itching, or bleeding lesions.     Review of Systems:  No other skin or systemic complaints other than what is documented elsewhere in the note.    The following portions of the chart were reviewed this encounter and updated as appropriate:         Skin Cancer History  Biopsy Log Book  No skin cancers from Specimen Tracking.    Additional History      Specialty Problems          Dermatology Problems    Actinic keratosis    Hemangioma of skin and subcutaneous tissue    Keloid    Lichen simplex chronicus    Melanocytic nevi of left upper limb, including shoulder    Melanocytic nevi of scalp and neck    Melanocytic nevi of trunk    Personal history of malignant melanoma of skin    - History of stage T2b melanoma of right calf s/p WLE and indeterminate SLNB 2017, followed as stage IIIA by surgical oncology with lymph node surveillance, cleared for follow up in April 2022      Lesion 1: Nodular Basal Cell Carcinoma.  Deep margins involved.  Year Diagnosed:  2019. November.  Location:  Rt upper chest  Treatment(s): excision 12/2019  Pathology: [id number]     Lesion 2: Superficial Basal Cell Carcinoma.  Deep margins involved.  Year Diagnosed:  2020.  May.  Location:  Medial Upper Chest.  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    A full examination was performed including scalp, head, eyes, ears, nose, lips, neck, chest, axillae, abdomen, back, buttocks, bilateral upper extremities, bilateral lower extremities, hands, feet, fingers, toes, fingernails, and toenails. All findings within normal limits unless otherwise noted below.    Assessment/Plan   Skin Exam  1. NEOPLASM OF UNCERTAIN BEHAVIOR OF SKIN (2)  Left Forearm - Posterior  1 x 1.1 cm pink plaque with irregular pigment pattern on dermoscopy    Lesion biopsy  Type of biopsy: tangential    Informed consent: discussed and consent obtained    Timeout: patient name, date of birth, surgical site, and procedure verified    Procedure prep:  Patient was prepped and draped  Anesthesia: the lesion was anesthetized in a standard fashion    Local anesthetic: tetracaine.  Instrument used: DermaBlade    Hemostasis achieved with: aluminum chloride    Outcome: patient tolerated procedure well    Post-procedure details: sterile dressing applied and wound care instructions given    Dressing type: petrolatum and bandage    Specimen 1 - Dermatopathology- DERM LAB  Differential Diagnosis: amelanotic melanoma vs SCC  Check Margins Yes/No?:    Comments:    Dermpath Lab: Routine Histopathology (formalin-fixed tissue)  Right Thigh - Anterior  0.6 x 0.6 cm pink scaly papule     Lesion biopsy  Type of biopsy: tangential    Informed consent: discussed and consent obtained    Timeout: patient name, date of birth, surgical site, and procedure verified    Procedure prep:  Patient was prepped and draped  Anesthesia: the lesion was anesthetized in a standard fashion    Local anesthetic: tetracaine.  Instrument used: DermaBlade    Hemostasis achieved with: aluminum chloride    Outcome: patient tolerated procedure well    Post-procedure details: sterile dressing applied and wound care instructions given    Dressing type: petrolatum and bandage    Specimen 2 - Dermatopathology- DERM LAB  Differential  Diagnosis: SCC vs BCC  Check Margins Yes/No?:    Comments:    Dermpath Lab: Routine Histopathology (formalin-fixed tissue)  2. ACTINIC KERATOSIS (6)  Left Buccal Cheek, Left Forehead (2), Right Buccal Cheek (2), Right Zygomatic Area  Erythematous macules with gritty scale.  Destr of lesion - Left Buccal Cheek, Left Forehead (2), Right Buccal Cheek (2), Right Zygomatic Area  Complexity: simple    Destruction method: cryotherapy    Informed consent: discussed and consent obtained    Lesion destroyed using liquid nitrogen: Yes    Region frozen until ice ball extended beyond lesion: Yes    Cryotherapy cycles:  1  Outcome: patient tolerated procedure well with no complications    Post-procedure details: wound care instructions given    3. MULTIPLE BENIGN MELANOCYTIC NEVI  Generalized  - scattered regular brown macules and papules  -2 brown macules on left lateral foot, 3mm and 1mm in width  Benign melanocytic nevi  -Monitored photo of 2 lesions on left lateral foot  - Discussed benign nature and that no treatment is necessary unless it becomes painful or increases in size. Patient opts for clinical monitoring at this time.    - Sun protective behavior reviewed and encouraged including the use of over-the-counter sunscreen with SPF30+ daily (reapply every 1.5 hours when outdoors), UPF clothing, broad rimmed hats, sunglasses, and avoidance of midday sun. Home skin monitoring encouraged and how to monitor for skin cancer (changing or new moles, new rapidly growing or non-healing lesions) reviewed. Patient encouraged to call with interval concerns or changes.    4. LYMPHEDEMA  Right Lower Leg - Anterior  2+ edema of right lower extremity   Lymphedema, secondary to sentinel lymph node removal   -Discussed the nature of the condition with patient. Patient currently wears compression sock, but continues to have swelling   -Following with Dr. Paul, Vascular Medicine for management, advised continuous use of compression  stockings during the day and leg elevation in bed  5. LENTIGO  Generalized  Scattered tan macules in sun-exposed areas.  - Benign sun-related nature of disorder reviewed. Need for strict UV protection to prevent darkening and additional UV damage reviewed.   6. SUN-DAMAGED SKIN  Generalized  - scattered tan macules, telangiectasias, and general photo-damage  Actinically damaged skin-  - Sun protective behavior reviewed and encouraged including the use of over-the-counter sunscreen with SPF30+ daily (reapply every 1.5 hours when outdoors), UPF clothing, broad rimmed hats, sunglasses, and avoidance of midday sun. Home skin monitoring encouraged and how to monitor for skin cancer (changing or new moles, new rapidly growing or non-healing lesions) reviewed. Patient encouraged to call with interval concerns or changes.    7. SEBORRHEIC KERATOSIS  Generalized  Stuck on verrucous, tan-brown papules and plaques.    - Discussed benign nature and that no treatment is necessary unless it becomes painful or increases in size. Patient opts for clinical monitoring at this time.     8. PERSONAL HISTORY OF OTHER MALIGNANT NEOPLASM OF SKIN  Generalized  Well healed scars at the right upper chest, medial upper chest, and left dorsum hand  -Basal cell carcinoma right upper chest s/p WLE 12/2019  -Superficial basal cell carcinoma medial upper chest s/p ED&C 10/13/2020  -Squamous cell carcinoma in situ in association with actinic keratosis on the left dorsum hand s/p re-scoop excision 11/1/22  -No evidence of recurrence  -Reassurance provided  9. PERSONAL HISTORY OF MALIGNANT MELANOMA OF SKIN  Generalized  Well healed scar at site of prior melanoma   -History of stage T2b melanoma of right calf s/p WLE and indeterminate SLNB 2017  -No lymphadenopathy noted on exam today   Related Procedures  Follow Up In Dermatology - Established Patient  Follow Up In Dermatology - Established Patient  Follow Up In Dermatology - Established Patient  10.  SCAR CONDITIONS AND FIBROSIS OF SKIN  Generalized  - Well healed scar at prior treatment sites without visual or palpable evidence of recurrence.   - Well healed scar(s) at sites of prior skin cancer and/or dysplastic nevi.  - Sun protective behavior reviewed and encouraged including the use of over-the-counter sunscreen with SPF30+ daily (reapply every 1.5 hours when outdoors), UPF clothing, broad rimmed hats, sunglasses, and avoidance of midday sun. Home skin monitoring encouraged and how to monitor for skin cancer (changing or new moles, new rapidly growing or non-healing lesions) reviewed. Patient encouraged to call with interval concerns or changes.       RTC 3 months (per pt preference) with another provider  RTC 6 months with Dr. Herrera,     Patient seen and discussed with Dr. Herrera,   Missy Vaughn MD MPH  PGY-2, Department of Dermatology    I saw and evaluated the patient. I personally obtained the key and critical portions of the history and physical exam or was physically present for key and critical portions performed by the resident/fellow. I reviewed the resident/fellow's documentation and discussed the patient with the resident/fellow. I agree with the resident/fellow's medical decision making as documented in the note and made changes where appropriate.

## 2025-04-18 ENCOUNTER — DOCUMENTATION (OUTPATIENT)
Dept: DERMATOLOGY | Facility: HOSPITAL | Age: 70
End: 2025-04-18
Payer: COMMERCIAL

## 2025-04-18 LAB
LABORATORY COMMENT REPORT: NORMAL
PATH REPORT.FINAL DX SPEC: NORMAL
PATH REPORT.GROSS SPEC: NORMAL
PATH REPORT.MICROSCOPIC SPEC OTHER STN: NORMAL
PATH REPORT.RELEVANT HX SPEC: NORMAL
PATH REPORT.TOTAL CANCER: NORMAL
PATHOLOGY SYNOPTIC REPORT: NORMAL

## 2025-04-18 NOTE — PROGRESS NOTES
Pt called derm on call line tearful that she saw her recent skin bx result showing melanoma w/0.1mm Breslow depth from L forearm. Stated she hadn't heard from anyone but the biopsy was 2 days ago. Expressed fear about having to go through SLNB and significant surgery because she had a melanoma before that was T2b requiring such.    Told pt that Dr. Herrera would not fail to follow up with her given biopsy results but that unfortunately they are released immediately and not held until we can read them and reach out. Told pt that at this Breslow depth the plan does not include sentinel lymph node biopsy and would likely include a wide local excision only but deferred final plan to Dr. Herrera. Pt understood and had no further questions and stated she wished she didn't read [employer].

## 2025-04-20 DIAGNOSIS — C43.9 MELANOMA OF SKIN (MULTI): ICD-10-CM

## 2025-04-21 ENCOUNTER — TUMOR BOARD CONFERENCE (OUTPATIENT)
Dept: HEMATOLOGY/ONCOLOGY | Facility: HOSPITAL | Age: 70
End: 2025-04-21
Payer: COMMERCIAL

## 2025-04-21 ENCOUNTER — APPOINTMENT (OUTPATIENT)
Dept: ENDOCRINOLOGY | Facility: CLINIC | Age: 70
End: 2025-04-21
Payer: MEDICARE

## 2025-04-21 NOTE — TUMOR BOARD NOTE
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    Recommendation:  WLE with 1 cm margins vs. Mohs surgery.    Review Multidisciplinary Cutaneous Oncology Conference recommendation with patient.  Continue routine follow up and total body skin exams with Nathan Herrera.    Follow Up:  Nathan Herrera, Derm Surgery.      History and Physical Exam  Dermatologic History:   70 y.o. female with a biopsy of the left forearm posterior on 4/16/2025 showing a a non-ulcerated melanoma, lentigo maligna type, Breslow: 0.1 mm.      Pathology  Dermatopathology- DERM LAB: [id number]  Order: [id number]   Collected 4/16/2025 13:55       Status: Final result       Dx: Neoplasm of uncertain behavior of skin    Test Result Released: Yes (seen)       Messages: Seen    1 Result Note       1 Patient Communication      Component    FINAL DIAGNOSIS   A: SKIN, LEFT FOREARM - POSTERIOR, SHAVE BIOPSY:  MALIGNANT MELANOMA BRESLOW THICKNESS 0.1 MM, PRESENT ON THE DEEP AND PERIPHERAL MARGIN, SEE NOTE.     Note: Microscopic examination reveals a specimen that extends into the mid reticular dermis. There is moderate to dense solar elastosis and there is an asymmetric proliferation of nested and single atypical melanocytes throughout all layers of the epidermis. The melanocytes have moderately enlarged nuclei with mild cytoplasm. A step section was performed and there is a rare nest of melanocytes in the dermis without definite attachment to the overlying epidermis.      B: SKIN, RIGHT THIGH - ANTERIOR, SHAVE BIOPSY:  LICHENOID KERATOSIS.     ** Electronically  signed out by Corbin Gardner MD **         Electronically signed by Corbin Gardner MD on 4/18/2025 at 1543 EDT        By the signature on this report, the individual or group listed as making the Final Interpretation/Diagnosis certifies that they have reviewed this case.    Clinical History    Encounter Diagnosis: Neoplasm of uncertain behavior of skin         [id number]  Collection Comments: Differential Diagnosis: amelanotic melanoma vs SCC  Check Margins Yes/No?:    Comments:    Dermpath Lab: Routine Histopathology (formalin-fixed tissue)  Finding Region: Left Forearm - Posterior  Specimen Objective: 1 x 1.1 cm pink plaque with irregular pigment pattern on dermoscopy     [id number] B  Collection Comments: Differential Diagnosis: SCC vs BCC  Check Margins Yes/No?:    Comments:    Dermpath Lab: Routine Histopathology (formalin-fixed tissue)  Finding Region: Right Thigh - Anterior  Specimen Objective: 0.6 x 0.6 cm pink scaly papule    Microscopic Description    B. There is basal layer vacuolization with a superficial lymphocyte infiltrate.      Gross Description    A:  Received in formalin is a 10 x 8 x 1 mm piece of skin.  The specimen was trisected.  It is tan and brown in color.  It is shave in shape.  It was embedded in toto.  The specimen was inked.         B:  Received in formalin is a 9 x 8 x 1 mm piece of skin.  The specimen was trisected.  It is tan and brown in color.  It is shave in shape.  It was embedded in toto.  The specimen was inked.       The specimen was grossed by Sabina Garay.        Case Summary Report   MELANOMA OF THE SKIN: Biopsy   8th Edition - Protocol posted: 3/23/2022MELANOMA OF THE SKIN: BIOPSY - All Specimens  SPECIMEN   Procedure  Biopsy, shave   Specimen Laterality  Left   TUMOR   Tumor Site  Skin of upper limb and shoulder: Left forearm - posterior        Histologic Type  Lentigo maligna melanoma   Maximum Tumor (Breslow) Thickness (Millimeters)  0.1 mm   Ulceration  Not identified    Anatomic ([place]) Level  II (melanoma present in but does not fill and expand papillary dermis)   Mitotic Rate  None identified   Microsatellite(s)  Not identified   Lymphovascular Invasion  Not identified   Neurotropism  Not identified   Tumor-Infiltrating Lymphocytes  Not identified   Tumor Regression  Not identified   MARGINS     Margin Status for Invasive Melanoma  All margins negative for invasive melanoma   Margin Status for Melanoma in situ  Melanoma in situ present at margin   Margin(s) Involved by Melanoma in Situ  Peripheral     Deep   PATHOLOGIC STAGE CLASSIFICATION (pTNM, AJCC 8th Edition)     pT Category  pT1a   .      Resulting Agency [facility] DERM LAB

## 2025-04-24 DIAGNOSIS — I10 BENIGN ESSENTIAL HYPERTENSION: ICD-10-CM

## 2025-04-24 DIAGNOSIS — E78.2 MIXED HYPERLIPIDEMIA: ICD-10-CM

## 2025-04-24 RX ORDER — ATORVASTATIN CALCIUM 40 MG/1
40 TABLET, FILM COATED ORAL DAILY
Qty: 90 TABLET | Refills: 3 | Status: SHIPPED | OUTPATIENT
Start: 2025-04-24

## 2025-04-24 RX ORDER — HYDROCHLOROTHIAZIDE 50 MG/1
50 TABLET ORAL DAILY
Qty: 90 TABLET | Refills: 3 | Status: SHIPPED | OUTPATIENT
Start: 2025-04-24

## 2025-04-27 PROBLEM — R11.0 CHRONIC NAUSEA: Status: ACTIVE | Noted: 2025-04-27

## 2025-04-27 RX ORDER — ONDANSETRON 4 MG/1
4 TABLET, ORALLY DISINTEGRATING ORAL EVERY 8 HOURS PRN
Qty: 20 TABLET | Refills: 0 | Status: SHIPPED | OUTPATIENT
Start: 2025-04-27 | End: 2025-05-04

## 2025-05-05 ENCOUNTER — TELEPHONE (OUTPATIENT)
Dept: PRIMARY CARE | Facility: CLINIC | Age: 70
End: 2025-05-05
Payer: COMMERCIAL

## 2025-05-05 NOTE — TELEPHONE ENCOUNTER
Pt stopped by to ask if she can get her Kenalog allergy shot while she is taking insulin. She said her doctor wants to know. He doesn't want her sugar to be raised.  wants something stating that fact faxed to him. Fax information to [phone] (Dr Nabor Adkins)

## 2025-05-09 NOTE — TELEPHONE ENCOUNTER
Please call Zakiya  I do not recommend getting Kenalog allergy shots because that will make the sugar go up

## 2025-05-13 ENCOUNTER — APPOINTMENT (OUTPATIENT)
Dept: DERMATOLOGY | Facility: CLINIC | Age: 70
End: 2025-05-13
Payer: COMMERCIAL

## 2025-05-13 DIAGNOSIS — L57.0 ACTINIC KERATOSES: ICD-10-CM

## 2025-05-13 DIAGNOSIS — L81.4 LENTIGO: ICD-10-CM

## 2025-05-13 DIAGNOSIS — Z85.820 PERSONAL HISTORY OF MALIGNANT MELANOMA OF SKIN: ICD-10-CM

## 2025-05-13 DIAGNOSIS — D22.9 MELANOCYTIC NEVUS, UNSPECIFIED LOCATION: ICD-10-CM

## 2025-05-13 DIAGNOSIS — L82.1 SEBORRHEIC KERATOSIS: ICD-10-CM

## 2025-05-13 DIAGNOSIS — D18.01 HEMANGIOMA OF SKIN: ICD-10-CM

## 2025-05-13 DIAGNOSIS — D48.5 NEOPLASM OF UNCERTAIN BEHAVIOR OF SKIN: Primary | ICD-10-CM

## 2025-05-13 DIAGNOSIS — L57.9 SKIN CHANGES DUE TO CHRONIC EXPOSURE TO NONIONIZING RADIATION: ICD-10-CM

## 2025-05-13 DIAGNOSIS — Z12.83 SCREENING EXAM FOR SKIN CANCER: ICD-10-CM

## 2025-05-13 DIAGNOSIS — D03.62 MELANOMA IN SITU OF LEFT UPPER EXTREMITY: ICD-10-CM

## 2025-05-13 PROCEDURE — 11102 TANGNTL BX SKIN SINGLE LES: CPT | Performed by: STUDENT IN AN ORGANIZED HEALTH CARE EDUCATION/TRAINING PROGRAM

## 2025-05-13 PROCEDURE — 17003 DESTRUCT PREMALG LES 2-14: CPT | Performed by: STUDENT IN AN ORGANIZED HEALTH CARE EDUCATION/TRAINING PROGRAM

## 2025-05-13 PROCEDURE — 17000 DESTRUCT PREMALG LESION: CPT | Performed by: STUDENT IN AN ORGANIZED HEALTH CARE EDUCATION/TRAINING PROGRAM

## 2025-05-13 PROCEDURE — 1159F MED LIST DOCD IN RCRD: CPT | Performed by: STUDENT IN AN ORGANIZED HEALTH CARE EDUCATION/TRAINING PROGRAM

## 2025-05-13 PROCEDURE — 99213 OFFICE O/P EST LOW 20 MIN: CPT | Performed by: STUDENT IN AN ORGANIZED HEALTH CARE EDUCATION/TRAINING PROGRAM

## 2025-05-13 NOTE — Clinical Note
Concerning lesion found on exam. DDX for lesion includes: blk vs nmsc. The need for biopsy to aid in diagnosis was discussed and recommended. Risks and benefits of biopsy were reviewed. See procedure note.

## 2025-05-13 NOTE — Clinical Note
Clinically benign appearing nevi, reassurance provided to the patient today. Discussed important of sun protection with sun protective clothing and/or broad spectrum sunscreen spf 30 or above.  Patient to f/u should they notice any new or changing pre-existing skin lesion.

## 2025-05-13 NOTE — Clinical Note
Scar from biopsy site    Discussed diagnosis, reviewed BD on 0.1 mm and no need for SLNB. Scheduled for excision tomorrow

## 2025-05-13 NOTE — PROGRESS NOTES
Subjective     Zakiya Barrow is a 70 y.o. female who presents for the following: Skin Check (FBSE. Hx of skin cancer. Patient is scheduled for Mohs tomorrow- Malignant melanoma left forearm. Today she is concerned with dark spot on right thigh, spot back of left thigh, spot mid back, as well as red spot on forehead.).          Review of Systems:  No other skin or systemic complaints other than what is documented elsewhere in the note.    The following portions of the chart were reviewed this encounter and updated as appropriate:         Skin Cancer History  Biopsy Log Book  Biopsied Type Location Status   4/16/25 Melanoma Left Forearm - Posterior Refer Mohs/Surgeon   4/16/25 Other Benign Neoplasm Right Thigh - Anterior Patient/Caregiver Informed  4/21/25       Additional History      Specialty Problems          Dermatology Problems    Actinic keratosis    Hemangioma of skin and subcutaneous tissue    Keloid    Lichen simplex chronicus    Melanocytic nevi of left upper limb, including shoulder    Melanocytic nevi of scalp and neck    Melanocytic nevi of trunk    Personal history of malignant melanoma of skin    - History of stage T2b melanoma of right calf s/p WLE and indeterminate SLNB 2017, followed as stage IIIA by surgical oncology with lymph node surveillance, cleared for follow up in April 2022      Lesion 1: Nodular Basal Cell Carcinoma.  Deep margins involved.  Year Diagnosed:  2019. November.  Location:  Rt upper chest  Treatment(s): excision 12/2019  Pathology: [id number]     Lesion 2: Superficial Basal Cell Carcinoma.  Deep margins involved.  Year Diagnosed:  2020.  May.  Location:  Medial Upper Chest.  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    A full examination was performed including scalp, head, eyes, ears, nose, lips, neck, chest, axillae, abdomen, back, buttocks, bilateral upper extremities, bilateral lower extremities, hands, feet, fingers, toes, fingernails, and toenails. All findings within normal limits unless otherwise noted below.    Assessment/Plan   Skin Exam  1. NEOPLASM OF UNCERTAIN BEHAVIOR OF SKIN  Right Lower Leg - Anterior  5 mm pink scaly macule    Lesion biopsy  Type of biopsy: tangential    Informed consent: discussed and consent obtained    Timeout: patient name, date of birth, surgical site, and procedure verified    Procedure prep:  Patient was prepped and draped  Anesthesia: the lesion was anesthetized in a standard fashion    Anesthetic:  1% lidocaine w/ epinephrine 1-100,000 local infiltration  Instrument used: DermaBlade    Hemostasis achieved with: aluminum chloride    Outcome: patient tolerated procedure well    Post-procedure details: sterile dressing applied and wound care instructions given    Dressing type: petrolatum and bandage      Staff Communication: Normal saline 3 ml  Specimen 1 - Dermatopathology- DERM LAB  Differential Diagnosis: blk vs nmsc   Check Margins Yes/No?:    Comments:    Dermpath Lab: Routine Histopathology (formalin-fixed tissue)  Concerning lesion found on exam. DDX for lesion includes: blk vs nmsc. The need for biopsy to aid in diagnosis was discussed and recommended. Risks and benefits of biopsy were reviewed. See procedure note.      2. PERSONAL HISTORY OF MALIGNANT MELANOMA OF SKIN  Generalized  Well healed scar at site of prior melanoma   -History of stage T2b melanoma of right calf s/p WLE and indeterminate SLNB 2017    3. LENTIGO  Generalized  Scattered tan macules in sun-exposed areas.  Benign nature of these skin lesions reviewed and  relation to sun exposure discussed. Reassurance provided. Reviewed warning signs of skin cancer.  4. HEMANGIOMA OF SKIN  Generalized  Bright red papules  Discussed benign nature of condition, reassured. Reviewed warning signs of skin cancer with patient.  5. SEBORRHEIC KERATOSIS  Generalized  Stuck on verrucous, tan-brown papules and plaques.    The benign nature of these skin lesions were reviewed with the patient today and reassurance was provided. Patient advised to return for f/u if the lesions change in size, shape, color, become painful, tender, itch or bleed.  6. MELANOCYTIC NEVUS, UNSPECIFIED LOCATION  Generalized  Benign to slightly atypical appearing brown pigmented macules and papules  Clinically benign appearing nevi, reassurance provided to the patient today. Discussed important of sun protection with sun protective clothing and/or broad spectrum sunscreen spf 30 or above.  Patient to f/u should they notice any new or changing pre-existing skin lesion.  7. ACTINIC KERATOSES (4)  Dorsum of Nose, Glabella, Left Ear, Left Malar Cheek  Erythematous gritty macule(s)  Lesions are due to chronic, cumulative sun damage over time and are pre-cancerous. They have a risk of developing into squamous cell carcinoma and therefore treatment recommendations were offered and discussed with the patient. Discussed LN2 & topical chemotherapy creams, risks and benefits of each. The risks and benefits of LN2 were reviewed including incomplete removal, crusting, blister hypo and/or hyperpigmentation, scarring. The patient elected for LN2 today.  Destr of lesion - Dorsum of Nose, Glabella, Left Ear, Left Malar Cheek  Complexity: simple    Destruction method: cryotherapy    Informed consent: discussed and consent obtained    Lesion destroyed using liquid nitrogen: Yes    Region frozen until ice ball extended beyond lesion: Yes    Cryotherapy cycles:  1  Outcome: patient tolerated procedure well with no complications     Post-procedure details: wound care instructions given    8. SKIN CHANGES DUE TO CHRONIC EXPOSURE TO NONIONIZING RADIATION  Generalized  Mottled pigmentation, telangiectasias and brown reticular macules in sun exposed areas on the face, extremities, trunk  The risk of chronic, cumulative sun damage and risk of development of skin cancer was reviewed with the patient today. Discussed important of sun protection with sun protective clothing and/or broad spectrum sunscreen spf 30 or above.  Warning signs of skin cancer were reviewed. Patient to contact office should they notice any new or changing pre-existing skin lesion.  9. SCREENING EXAM FOR SKIN CANCER  Generalized  10. MELANOMA IN SITU OF LEFT UPPER EXTREMITY  Left Forearm - Anterior  Scar from biopsy site    Discussed diagnosis, reviewed BD on 0.1 mm and no need for SLNB. Scheduled for excision tomorrow       Implemented All Universal Safety Interventions:  [place] to call system. Call bell, personal items and telephone within reach. Instruct patient to call for assistance. Room bathroom lighting operational. Non-slip footwear when patient is off stretcher. Physically safe environment: no spills, clutter or unnecessary equipment. Stretcher in lowest position, wheels locked, appropriate side rails in place.

## 2025-05-14 ENCOUNTER — APPOINTMENT (OUTPATIENT)
Dept: DERMATOLOGY | Facility: CLINIC | Age: 70
End: 2025-05-14
Payer: MEDICARE

## 2025-05-14 VITALS — SYSTOLIC BLOOD PRESSURE: 130 MMHG | DIASTOLIC BLOOD PRESSURE: 80 MMHG | HEART RATE: 89 BPM

## 2025-05-14 DIAGNOSIS — C43.9 MELANOMA OF SKIN (MULTI): ICD-10-CM

## 2025-05-14 NOTE — PROGRESS NOTES
Excision Operative Note    Date of Surgery:  5/14/2025  Surgeon:  Jamey Grider MD PhD  Office Location: [address]  [address]  [facility] [address]  Dept: [phone]  Dept Fax: [phone]  Referring Provider: Nathan Herrera MD  [id number]  Department [place] [address]    Subjective   Zakyia Barrow is a 70 y.o. female who presents for the following: Excision (Left forearm posterior MM/LIDO ALLERGY BROUGHT A BENADRYL HAS A  ()) for melanoma. 3rd MM, Lymph involvement in the past    LIDOCAINE ALLERGY BROUGHT BENADRYL. Will use Bupivacaine today, has used in the past ([facility] documentation)  Benadryl taken at 1:45 ( here to drive)    According to the patient, the lesion has been present for approximately 6 months at the time of diagnosis.  The lesion is not causing symptoms.  The lesion has not been treated previously.    The patient does not have a pacemaker / defibrillator.  The patient does not have a heart valve / joint replacement.    The patient is not on blood thinners.   The patient does not have a history of hepatitis B or C.  The patient does not have a history of HIV.  The patient does not have a history of immunosuppression (e.g. organ transplantation, malignancy, medications)    Is it okay to leave a phone message with results? Yes      The following portions of the chart were reviewed this encounter and updated as appropriate:         Assessment/Plan   Pre-procedure:   Obtained informed consent: written from patient  The surgical site was identified and confirmed with the patient.     Intra-operative:   Audible time out called at 2:00 PM 05/15/25  by: Keli Lozoya LPN   Verified patient name, birthdate, site, specimen bottle label & requisition.    The planned procedure(s) was again reviewed with the patient. The risks of bleeding, infection, nerve damage and scarring were reviewed. The patient  identity, surgical site, and planned procedure(s) were verified.     Biopsy Accession Number: [id number]  MELANOMA OF SKIN (MULTI)  Left Forearm - Posterior  Skin excision    Lesion length (cm):  1.3  Lesion width (cm):  2  Margin per side (cm):  1  Total excision diameter (cm):  4  Melanoma Breslow depth (mm): 0  Informed consent: discussed and consent obtained    Timeout: patient name, date of birth, surgical site, and procedure verified    Procedure prep:  Patient was prepped and draped  Anesthesia: the lesion was anesthetized in a standard fashion    Local anesthetic: 0.5% Bupivicaine No Epi and 6cc's Saline used prior to Anesthetic.  Instrument used: #15 blade    Hemostasis achieved with: electrodesiccation    Outcome: patient tolerated procedure well with no complications    Post-procedure details: sterile dressing applied and wound care instructions given    Dressing type: pressure dressing, Kerlix, petrolatum, Coban, Telfa pad and Hypafix    Additional details:  Melanoma Kev:   Curative Intent: Yes  Original Breslow Thickness: 0.1 mm  Clinical margin width: 1 cm  Depth of excision: Full thickness     Skin repair  Complexity:  Complex  Final length (cm):  9  Informed consent: discussed and consent obtained    Timeout: patient name, date of birth, surgical site, and procedure verified    Procedure prep:  Patient prepped in sterile fashion  Anesthesia: the lesion was anesthetized in a standard fashion    Anesthetic:  1% lidocaine w/ epinephrine 1-100,000 local infiltration  Reason for type of repair: reduce tension to allow closure    Undermining: edges undermined    Subcutaneous layers (deep stitches):   Suture size:  4-0  Suture type: Vicryl (polyglactin 910)    Stitches:  Buried vertical mattress  Fine/surface layer approximation (top stitches):   Suture size:  4-0  Suture type: Prolene (polypropylene)    Stitches: simple running    Hemostasis achieved with: electrodesiccation  Outcome: patient tolerated  procedure well with no complications    Post-procedure details: sterile dressing applied and wound care instructions given    Dressing type: pressure dressing, bandage and petrolatum    Additional details:  Educated to elevate arm frequently and if Coban wrap is too tight may cut edges to loosen before 48 HRS  Specimen 1 - Dermatopathology- DERM LAB  Differential Diagnosis: Malignant Melanoma  Check Margins:  yes  Comments:  [id number]  Dermpath Lab: Routine Histopathology (formalin-fixed tissue)  Complex Linear Repair - Wide Undermining:  Given the location and size of the defect, it was determined that a complex layered linear closure was required to restore normal anatomy and function. The repair was considered complex because extensive undermining was required and performed. The amount of undermining performed was greater than the maximum width of the defect as measured perpendicular to the closure line along at least one entire edge of the defect. Standing cutaneous cones were removed using Burow's triangles. The wound edges were brought into close approximation with buried vertical mattress sutures. The remainder of the wound was then closed with epidermal top sutures.              The final repair measured 9.0 cm    Wound care was discussed, and the patient was given written post-operative wound care instructions.      The patient will follow up with Jamey Grider MD PhD as needed for any post operative problems or concerns, and will follow up with their primary dermatologist as scheduled.      Jamey Grider MD, PhD

## 2025-05-19 LAB
LAB AP ASR DISCLAIMER: NORMAL
LAB AP ASR DISCLAIMER: NORMAL
LABORATORY COMMENT REPORT: NORMAL
LABORATORY COMMENT REPORT: NORMAL
PATH REPORT.FINAL DX SPEC: NORMAL
PATH REPORT.FINAL DX SPEC: NORMAL
PATH REPORT.GROSS SPEC: NORMAL
PATH REPORT.GROSS SPEC: NORMAL
PATH REPORT.MICROSCOPIC SPEC OTHER STN: NORMAL
PATH REPORT.RELEVANT HX SPEC: NORMAL
PATH REPORT.RELEVANT HX SPEC: NORMAL
PATH REPORT.TOTAL CANCER: NORMAL
PATH REPORT.TOTAL CANCER: NORMAL
PATHOLOGY SYNOPTIC REPORT: NORMAL

## 2025-05-20 DIAGNOSIS — D48.5 ATYPICAL INTRAEPIDERMAL MELANOCYTIC PROLIFERATION: Primary | ICD-10-CM

## 2025-05-23 ENCOUNTER — APPOINTMENT (OUTPATIENT)
Dept: DERMATOLOGY | Facility: CLINIC | Age: 70
End: 2025-05-23
Payer: MEDICARE

## 2025-05-23 DIAGNOSIS — Z48.02 ENCOUNTER FOR REMOVAL OF SUTURES: ICD-10-CM

## 2025-05-23 PROCEDURE — 99024 POSTOP FOLLOW-UP VISIT: CPT | Performed by: DERMATOLOGY

## 2025-05-23 NOTE — PROGRESS NOTES
Suture Removal Note    Visit Summary  Chief Complaint    1. Suture Removal.    Zakiya Barrow is a 70 y.o. female who presents for 9 days  follow up after surgery for a melanoma.    Location Operation site location: Left Forearm- Posterior     On exam,  Ms. Barrow is well-appearing and in no apparent distress. The surgical site appears clean with minimal to no erythema. No tenderness and good wound edge apposition.    Assessment and Plan:    The patient was reassured that the wound is healing appropriately.   Sutures were removed without complication today.  The dressing was removed, the wound cleaned, and a new dressing reapplied.         The patient was advised on the importance of routine skin monitoring including follow up with general dermatology and instructed to call with any further concerns. The patient will return as needed.

## 2025-06-11 ENCOUNTER — APPOINTMENT (OUTPATIENT)
Dept: CARDIOLOGY | Facility: CLINIC | Age: 70
End: 2025-06-11
Payer: COMMERCIAL

## 2025-06-20 ENCOUNTER — APPOINTMENT (OUTPATIENT)
Dept: DERMATOLOGY | Facility: CLINIC | Age: 70
End: 2025-06-20
Payer: COMMERCIAL

## 2025-06-20 DIAGNOSIS — D48.5 ATYPICAL INTRAEPIDERMAL MELANOCYTIC PROLIFERATION: ICD-10-CM

## 2025-06-20 DIAGNOSIS — D48.5 NEOPLASM OF UNCERTAIN BEHAVIOR OF SKIN: Primary | ICD-10-CM

## 2025-06-20 PROCEDURE — 11402 EXC TR-EXT B9+MARG 1.1-2 CM: CPT | Performed by: STUDENT IN AN ORGANIZED HEALTH CARE EDUCATION/TRAINING PROGRAM

## 2025-06-20 NOTE — Clinical Note
Biopsy proven atypical intraepidermal melanocytic proliferation   -Reviewed pathology results with patient.   -Discussed excision and treatment options including secondary intention healing vs repair.   -After discussion of the risks and benefits, patient wishes to proceed with secondary intention healing.

## 2025-06-20 NOTE — PROGRESS NOTES
Subjective     Zakiya Barrow is a 70 y.o. female who presents for the following: Procedure (Excision of an atypical intraepidermal melanocytic proliferation on right lower leg.).     Patient last seen 5/13/25. At that visit, biopsy of the right lower leg - anterior demonstrated an atypical intraepidermal melanocytic proliferation. Today, patient returns to clinic for re-excision.     Review of Systems:  No other skin or systemic complaints other than what is documented elsewhere in the note.    The following portions of the chart were reviewed this encounter and updated as appropriate:          Skin Cancer History  Biopsy Log Book  Biopsied Type Location Status   4/16/25 Melanoma Left Forearm - Posterior Treatment Complete - Excision  5/14/25 4/16/25 Other Benign Neoplasm Right Thigh - Anterior Patient/Caregiver Informed  4/21/25 5/14/25 Melanoma in situ Left Forearm - Posterior Treatment Complete - Excision, clear margins  5/22/25       Additional History      Specialty Problems          Dermatology Problems    Actinic keratosis    Hemangioma of skin and subcutaneous tissue    Keloid    Lichen simplex chronicus    Melanocytic nevi of left upper limb, including shoulder    Melanocytic nevi of scalp and neck    Melanocytic nevi of trunk    Personal history of malignant melanoma of skin    - History of stage T2b melanoma of right calf s/p WLE and indeterminate SLNB 2017, followed as stage IIIA by surgical oncology with lymph node surveillance, cleared for follow up in April 2022      Lesion 1: Nodular Basal Cell Carcinoma.  Deep margins involved.  Year Diagnosed:  2019. November.  Location:  Rt upper chest  Treatment(s): excision 12/2019  Pathology: [id number]     Lesion 2: Superficial Basal Cell Carcinoma.  Deep margins involved.  Year Diagnosed:  2020.  May.  Location:  Medial Upper Chest.  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    A focused skin examination was performed. All findings within normal limits unless otherwise noted below.    Assessment/Plan   Skin Exam  1. NEOPLASM OF UNCERTAIN BEHAVIOR OF SKIN  Right Lower Leg - Anterior  On the right anterior lower leg, there is a pink atrophic macule at prior site of biopsy.   Skin excision    Lesion length (cm):  0.8  Lesion width (cm):  0.8  Margin per side (cm):  0.5  Total excision diameter (cm):  1.8  Informed consent: discussed and consent obtained    Timeout: patient name, date of birth, surgical site, and procedure verified    Procedure prep:  Patient was prepped and draped  Anesthesia: the lesion was anesthetized in a standard fashion    Anesthetic:  1% lidocaine w/ epinephrine 1-100,000 local infiltration  Instrument used: #15 blade    Hemostasis achieved with: electrodesiccation    Outcome: patient tolerated procedure well with no complications    Post-procedure details: sterile dressing applied and wound care instructions given    Dressing type: pressure dressing    Specimen 1 - Dermatopathology- DERM LAB  Differential Diagnosis: r/o residual atypical intraepidermal melanocytic proliferation  Check Margins Yes/No?:    Comments:    Dermpath Lab: Routine Histopathology (formalin-fixed tissue)  Biopsy proven atypical intraepidermal melanocytic proliferation   -Reviewed pathology results with patient.   -Discussed excision and treatment options including secondary intention healing vs repair.   -After discussion of the risks and benefits, patient wishes to proceed with secondary intention healing.       2. ATYPICAL INTRAEPIDERMAL MELANOCYTIC  PROLIFERATION      Related Procedures  Prior Authorization for Skin Excision - Minor Surgery      Packed with surgifoam  Pressure dressing applied

## 2025-06-21 ENCOUNTER — TELEPHONE (OUTPATIENT)
Dept: DERMATOLOGY | Facility: CLINIC | Age: 70
End: 2025-06-21
Payer: COMMERCIAL

## 2025-06-24 ENCOUNTER — APPOINTMENT (OUTPATIENT)
Dept: PRIMARY CARE | Facility: CLINIC | Age: 70
End: 2025-06-24
Payer: COMMERCIAL

## 2025-06-24 VITALS
WEIGHT: 167 LBS | SYSTOLIC BLOOD PRESSURE: 130 MMHG | DIASTOLIC BLOOD PRESSURE: 72 MMHG | HEART RATE: 96 BPM | TEMPERATURE: 97.3 F | BODY MASS INDEX: 28.51 KG/M2 | HEIGHT: 64 IN

## 2025-06-24 DIAGNOSIS — I10 PRIMARY HYPERTENSION: ICD-10-CM

## 2025-06-24 DIAGNOSIS — E11.65 TYPE 2 DIABETES MELLITUS WITH HYPERGLYCEMIA, WITHOUT LONG-TERM CURRENT USE OF INSULIN: Primary | ICD-10-CM

## 2025-06-24 DIAGNOSIS — E78.2 MIXED HYPERLIPIDEMIA: ICD-10-CM

## 2025-06-24 DIAGNOSIS — R39.9 UTI SYMPTOMS: ICD-10-CM

## 2025-06-24 LAB
LABORATORY COMMENT REPORT: NORMAL
PATH REPORT.FINAL DX SPEC: NORMAL
PATH REPORT.GROSS SPEC: NORMAL
PATH REPORT.MICROSCOPIC SPEC OTHER STN: NORMAL
PATH REPORT.RELEVANT HX SPEC: NORMAL
PATH REPORT.TOTAL CANCER: NORMAL
POC APPEARANCE, URINE: CLEAR
POC BILIRUBIN, URINE: NEGATIVE
POC BLOOD, URINE: ABNORMAL
POC COLOR, URINE: YELLOW
POC GLUCOSE, URINE: NEGATIVE MG/DL
POC KETONES, URINE: NEGATIVE MG/DL
POC LEUKOCYTES, URINE: ABNORMAL
POC NITRITE,URINE: NEGATIVE
POC PH, URINE: 6.5 PH
POC PROTEIN, URINE: ABNORMAL MG/DL
POC SPECIFIC GRAVITY, URINE: 1.02
POC UROBILINOGEN, URINE: 0.2 EU/DL

## 2025-06-24 PROCEDURE — 1159F MED LIST DOCD IN RCRD: CPT | Performed by: FAMILY MEDICINE

## 2025-06-24 PROCEDURE — 81003 URINALYSIS AUTO W/O SCOPE: CPT | Performed by: FAMILY MEDICINE

## 2025-06-24 PROCEDURE — 99214 OFFICE O/P EST MOD 30 MIN: CPT | Performed by: FAMILY MEDICINE

## 2025-06-24 PROCEDURE — 3008F BODY MASS INDEX DOCD: CPT | Performed by: FAMILY MEDICINE

## 2025-06-24 PROCEDURE — 3078F DIAST BP <80 MM HG: CPT | Performed by: FAMILY MEDICINE

## 2025-06-24 PROCEDURE — 1036F TOBACCO NON-USER: CPT | Performed by: FAMILY MEDICINE

## 2025-06-24 PROCEDURE — 1160F RVW MEDS BY RX/DR IN RCRD: CPT | Performed by: FAMILY MEDICINE

## 2025-06-24 PROCEDURE — 3075F SYST BP GE 130 - 139MM HG: CPT | Performed by: FAMILY MEDICINE

## 2025-06-24 RX ORDER — HYDROCHLOROTHIAZIDE 50 MG/1
50 TABLET ORAL DAILY
Qty: 90 TABLET | Refills: 3 | Status: SHIPPED | OUTPATIENT
Start: 2025-06-24

## 2025-06-24 RX ORDER — AMLODIPINE BESYLATE 5 MG/1
5 TABLET ORAL DAILY
Qty: 90 TABLET | Refills: 3 | Status: SHIPPED | OUTPATIENT
Start: 2025-06-24

## 2025-06-24 RX ORDER — ATORVASTATIN CALCIUM 40 MG/1
40 TABLET, FILM COATED ORAL DAILY
Qty: 90 TABLET | Refills: 3 | Status: SHIPPED | OUTPATIENT
Start: 2025-06-24

## 2025-06-24 RX ORDER — NITROFURANTOIN 25; 75 MG/1; MG/1
100 CAPSULE ORAL 2 TIMES DAILY
Qty: 14 CAPSULE | Refills: 0 | Status: SHIPPED | OUTPATIENT
Start: 2025-06-24 | End: 2025-07-01

## 2025-06-24 NOTE — PROGRESS NOTES
Subjective   Patient ID: Zakiya Barrow is a 70 y.o. female who presents for Follow-up (Pt here for med. Follow up/Pt presents with uti x2 wks/Burning when urinating, R low back pain/Pt is fasting).  Follow-up chronic conditions and new complaint of UTI symptoms    Hypertension  This is a chronic problem. The current episode started more than 1 year ago. The problem has been gradually improving since onset. The problem is controlled.   Hyperlipidemia  This is a chronic problem. The current episode started more than 1 year ago. The problem is controlled. Recent lipid tests were reviewed and are variable.   Diabetes  She presents for her follow-up diabetic visit. She has type 2 diabetes mellitus. Her disease course has been stable. There are no diabetic associated symptoms.   UTI   This is a new problem. The current episode started in the past 7 days. The problem occurs every urination.       Review of Systems  Constitutional: no chills, no fever and no night sweats.   Eyes: no blurred vision and no eyesight problems.   ENT: no hearing loss, no nasal congestion, no nasal discharge, no hoarseness and no sore throat.   Cardiovascular: no chest pain, no intermittent leg claudication, no lower extremity edema, no palpitations and no syncope.   Respiratory: no cough, no shortness of breath during exertion, no shortness of breath at rest and no wheezing.   Gastrointestinal: no abdominal pain, no blood in stools, no constipation, no diarrhea, no melena, no nausea, no rectal pain and no vomiting.   Genitourinary: no dysuria, no change in urinary frequency, no urinary hesitancy, no feelings of urinary urgency and no vaginal discharge.   Musculoskeletal: no arthralgias,  no back pain and no myalgias.   Integumentary: no new skin lesions and no rashes.   Neurological: no difficulty walking, no headache, no limb weakness, no numbness and no tingling.   Psychiatric: no anxiety, no depression, no anhedonia and no substance use  "disorders.   Endocrine: no recent weight gain and no recent weight loss.   Hematologic/Lymphatic: no tendency for easy bruising and no swollen glands .    Objective    Pulse 96   Temp 36.3 °C (97.3 °F)   Ht 1.626 m (5' 4\")   Wt 75.8 kg (167 lb)   LMP  (LMP Unknown)   BMI 28.67 kg/m²    Physical Exam  The patient appeared well nourished and normally developed. Vital signs as documented. Head exam is unremarkable. No scleral icterus or corneal arcus noted.  Pupils are equal round reactive to light extraocular movements are intact no hemorrhages noted on funduscopic exam mouth mucous membranes are moist no exudates ears canals clear TMs are gray pearly not injected nose no rhinorrhea or epistaxis Neck is without jugular venous distension, thyromegaly, or carotid bruits. Carotid upstrokes are brisk bilaterally. Lungs are clear to auscultation and percussion. Cardiac exam reveals the PMI to be normally sized and situated. Rhythm is regular. First and second heart sounds normal. No murmurs, rubs or gallops. Abdominal exam reveals normal bowel sounds, no masses, no organomegaly and no aortic enlargement. Extremities are nonedematous and both femoral and pedal pulses are normal.  Neurologic exam DTRs are equal bilaterally no focal deficits strength is symmetrical heme lymph no palpable lymph nodes in the neck axilla or groin    Assessment/Plan   Problem List Items Addressed This Visit       Primary hypertension    Relevant Medications    amLODIPine (Norvasc) 5 mg tablet    hydroCHLOROthiazide (HYDRODiuril) 50 mg tablet    Other Relevant Orders    CBC and Auto Differential    Mixed hyperlipidemia    Relevant Medications    atorvastatin (Lipitor) 40 mg tablet    Other Relevant Orders    Lipid Panel    Type 2 diabetes mellitus with hyperglycemia, without long-term current use of insulin - Primary    Relevant Medications    semaglutide 0.25 mg or 0.5 mg (2 mg/3 mL) pen injector    Other Relevant Orders    Hemoglobin A1C    " Comprehensive Metabolic Panel    TSH with reflex to Free T4 if abnormal    UTI symptoms    Relevant Medications    nitrofurantoin, macrocrystal-monohydrate, (Macrobid) 100 mg capsule    Other Relevant Orders    POCT UA Automated manually resulted    Urine Culture            Miryam Lozoya MD

## 2025-06-25 LAB
ALBUMIN SERPL-MCNC: 4.7 G/DL (ref 3.6–5.1)
ALP SERPL-CCNC: 70 U/L (ref 37–153)
ALT SERPL-CCNC: 50 U/L (ref 6–29)
ANION GAP SERPL CALCULATED.4IONS-SCNC: 12 MMOL/L (CALC) (ref 7–17)
AST SERPL-CCNC: 27 U/L (ref 10–35)
BASOPHILS # BLD AUTO: 50 CELLS/UL (ref 0–200)
BASOPHILS NFR BLD AUTO: 0.6 %
BILIRUB SERPL-MCNC: 0.7 MG/DL (ref 0.2–1.2)
BUN SERPL-MCNC: 19 MG/DL (ref 7–25)
CALCIUM SERPL-MCNC: 9.5 MG/DL (ref 8.6–10.4)
CHLORIDE SERPL-SCNC: 101 MMOL/L (ref 98–110)
CHOLEST SERPL-MCNC: 159 MG/DL
CHOLEST/HDLC SERPL: 3.1 (CALC)
CO2 SERPL-SCNC: 25 MMOL/L (ref 20–32)
CREAT SERPL-MCNC: 0.75 MG/DL (ref 0.5–1.05)
EGFRCR SERPLBLD CKD-EPI 2021: 86 ML/MIN/1.73M2
EOSINOPHIL # BLD AUTO: 67 CELLS/UL (ref 15–500)
EOSINOPHIL NFR BLD AUTO: 0.8 %
ERYTHROCYTE [DISTWIDTH] IN BLOOD BY AUTOMATED COUNT: 12.4 % (ref 11–15)
EST. AVERAGE GLUCOSE BLD GHB EST-MCNC: 128 MG/DL
EST. AVERAGE GLUCOSE BLD GHB EST-SCNC: 7.1 MMOL/L
GLUCOSE SERPL-MCNC: 111 MG/DL (ref 65–99)
HBA1C MFR BLD: 6.1 %
HCT VFR BLD AUTO: 48.6 % (ref 35–45)
HDLC SERPL-MCNC: 52 MG/DL
HGB BLD-MCNC: 15.7 G/DL (ref 11.7–15.5)
LDLC SERPL CALC-MCNC: 84 MG/DL (CALC)
LYMPHOCYTES # BLD AUTO: 1999 CELLS/UL (ref 850–3900)
LYMPHOCYTES NFR BLD AUTO: 23.8 %
MCH RBC QN AUTO: 31 PG (ref 27–33)
MCHC RBC AUTO-ENTMCNC: 32.3 G/DL (ref 32–36)
MCV RBC AUTO: 96 FL (ref 80–100)
MONOCYTES # BLD AUTO: 815 CELLS/UL (ref 200–950)
MONOCYTES NFR BLD AUTO: 9.7 %
NEUTROPHILS # BLD AUTO: 5468 CELLS/UL (ref 1500–7800)
NEUTROPHILS NFR BLD AUTO: 65.1 %
NONHDLC SERPL-MCNC: 107 MG/DL (CALC)
PLATELET # BLD AUTO: 298 THOUSAND/UL (ref 140–400)
PMV BLD REES-ECKER: 11.1 FL (ref 7.5–12.5)
POTASSIUM SERPL-SCNC: 3.6 MMOL/L (ref 3.5–5.3)
PROT SERPL-MCNC: 7.5 G/DL (ref 6.1–8.1)
RBC # BLD AUTO: 5.06 MILLION/UL (ref 3.8–5.1)
SODIUM SERPL-SCNC: 138 MMOL/L (ref 135–146)
TRIGL SERPL-MCNC: 131 MG/DL
TSH SERPL-ACNC: 1.56 MIU/L (ref 0.4–4.5)
WBC # BLD AUTO: 8.4 THOUSAND/UL (ref 3.8–10.8)

## 2025-06-26 DIAGNOSIS — N39.0 ACUTE UTI: Primary | ICD-10-CM

## 2025-06-27 LAB — BACTERIA UR CULT: ABNORMAL

## 2025-06-27 RX ORDER — CIPROFLOXACIN 500 MG/1
500 TABLET, FILM COATED ORAL 2 TIMES DAILY
Qty: 20 TABLET | Refills: 0 | Status: SHIPPED | OUTPATIENT
Start: 2025-06-27 | End: 2025-07-07

## 2025-06-30 ASSESSMENT — ENCOUNTER SYMPTOMS
DIABETIC ASSOCIATED SYMPTOMS: 0
HYPERTENSION: 1

## 2025-07-07 ENCOUNTER — OFFICE VISIT (OUTPATIENT)
Dept: PRIMARY CARE | Facility: CLINIC | Age: 70
End: 2025-07-07
Payer: MEDICARE

## 2025-07-07 VITALS
HEIGHT: 64 IN | SYSTOLIC BLOOD PRESSURE: 128 MMHG | BODY MASS INDEX: 28.75 KG/M2 | TEMPERATURE: 96.9 F | WEIGHT: 168.4 LBS | OXYGEN SATURATION: 95 % | HEART RATE: 74 BPM | DIASTOLIC BLOOD PRESSURE: 68 MMHG

## 2025-07-07 DIAGNOSIS — R07.81 RIB PAIN: Primary | ICD-10-CM

## 2025-07-07 PROCEDURE — 99214 OFFICE O/P EST MOD 30 MIN: CPT | Performed by: FAMILY MEDICINE

## 2025-07-07 PROCEDURE — 3008F BODY MASS INDEX DOCD: CPT | Performed by: FAMILY MEDICINE

## 2025-07-07 PROCEDURE — 1159F MED LIST DOCD IN RCRD: CPT | Performed by: FAMILY MEDICINE

## 2025-07-07 PROCEDURE — 1160F RVW MEDS BY RX/DR IN RCRD: CPT | Performed by: FAMILY MEDICINE

## 2025-07-07 PROCEDURE — 3078F DIAST BP <80 MM HG: CPT | Performed by: FAMILY MEDICINE

## 2025-07-07 PROCEDURE — 1036F TOBACCO NON-USER: CPT | Performed by: FAMILY MEDICINE

## 2025-07-07 PROCEDURE — 3074F SYST BP LT 130 MM HG: CPT | Performed by: FAMILY MEDICINE

## 2025-07-07 NOTE — PROGRESS NOTES
Subjective   Patient ID: Zakiya Barrow is a 70 y.o. female who presents for Rib Injury (Injured rib cage on laundry basket).  Very pleasant 70-year-old the bottom part of the door stop and she had an accidental fall onto the floor landed on her right ribs she had previous rib fractures in that area before she had pain all day really did not want to go to the emergency room she is here today she is not having any difficulty breathing but the pain is in the C shape around from where her bra line lies anterior around to her lower back she was able to sleep but it was a little uncomfortable does hurt a little bit to move no difficulty breathing no shortness of breath no cough        Review of Systems  Constitutional: no chills, no fever and no night sweats.   Eyes: no blurred vision and no eyesight problems.   ENT: no hearing loss, no nasal congestion, no nasal discharge, no hoarseness and no sore throat.   Cardiovascular: no chest pain, no intermittent leg claudication, no lower extremity edema, no palpitations and no syncope.   Respiratory: no cough, no shortness of breath during exertion, no shortness of breath at rest and no wheezing.   Gastrointestinal: no abdominal pain, no blood in stools, no constipation, no diarrhea, no melena, no nausea, no rectal pain and no vomiting.   Genitourinary: no dysuria, no change in urinary frequency, no urinary hesitancy, no feelings of urinary urgency and no vaginal discharge.   Musculoskeletal: no arthralgias,  no back pain and no myalgias.   Integumentary: no new skin lesions and no rashes.   Neurological: no difficulty walking, no headache, no limb weakness, no numbness and no tingling.   Psychiatric: no anxiety, no depression, no anhedonia and no substance use disorders.   Endocrine: no recent weight gain and no recent weight loss.   Hematologic/Lymphatic: no tendency for easy bruising and no swollen glands .    Objective    /68   Pulse 74   Temp 36.1 °C (96.9 °F)   " Ht 1.626 m (5' 4\")   Wt 76.4 kg (168 lb 6.4 oz)   LMP  (LMP Unknown)   SpO2 95%   BMI 28.91 kg/m²    Physical Exam  The patient appeared well nourished and normally developed. Vital signs as documented. Head exam is unremarkable. No scleral icterus or corneal arcus noted.  Pupils are equal round reactive to light extraocular movements are intact no hemorrhages noted on funduscopic exam mouth mucous membranes are moist no exudates ears canals clear TMs are gray pearly not injected nose no rhinorrhea or epistaxis Neck is without jugular venous distension, thyromegaly, or carotid bruits. Carotid upstrokes are brisk bilaterally. Lungs are clear to auscultation and percussion. Cardiac exam reveals the PMI to be normally sized and situated. Rhythm is regular. First and second heart sounds normal. No murmurs, rubs or gallops. Abdominal exam reveals normal bowel sounds, no masses, no organomegaly and no aortic enlargement. Extremities are nonedematous and both femoral and pedal pulses are normal.  Neurologic exam DTRs are equal bilaterally no focal deficits strength is symmetrical heme lymph no palpable lymph nodes in the neck axilla or groin    Assessment/Plan   Problem List Items Addressed This Visit    None  Visit Diagnoses         Rib pain    -  Primary    Relevant Orders    XR ribs right 2 views                 Miryam Lozoya MD  "

## 2025-07-10 NOTE — TELEPHONE ENCOUNTER
TELEPHONE ENCOUNTER  6/21/2025  Zakiya Barrow  [phone]    Reason for Call:  69 yo F calling regarding wound care question. She is one day s/p excision of MIS of the right lower leg-anterior left to heal by second intention. She reports that she removed the bandage today and also tried to remove the surgifoam. Reports she was not able to get all of the surgifoam out and is not sure what to do.    Patient advised:  I instructed the patient that surgifoam should be left in place, and it will eventually dissolve over time. Instructed her not to place back the surgifoam that was removed. Also instructed her to cover the area and continue daily wound care. Patient expressed understanding. Instructed her to call back if there are any other wound care issues.    Aida Saeed MD  PGY-2, Dermatology    Size Of Lesion: 4x4mm

## 2025-07-11 ENCOUNTER — APPOINTMENT (OUTPATIENT)
Dept: PRIMARY CARE | Facility: CLINIC | Age: 70
End: 2025-07-11
Payer: COMMERCIAL

## 2025-07-21 PROBLEM — R07.81 RIB PAIN: Status: ACTIVE | Noted: 2025-07-21

## 2025-07-23 ENCOUNTER — APPOINTMENT (OUTPATIENT)
Dept: DERMATOLOGY | Facility: CLINIC | Age: 70
End: 2025-07-23
Payer: COMMERCIAL

## 2025-07-29 ENCOUNTER — APPOINTMENT (OUTPATIENT)
Dept: DERMATOLOGY | Facility: CLINIC | Age: 70
End: 2025-07-29
Payer: COMMERCIAL

## 2025-08-01 ENCOUNTER — APPOINTMENT (OUTPATIENT)
Dept: ENDOCRINOLOGY | Facility: CLINIC | Age: 70
End: 2025-08-01
Payer: MEDICARE

## 2025-08-05 ENCOUNTER — APPOINTMENT (OUTPATIENT)
Dept: DERMATOLOGY | Facility: CLINIC | Age: 70
End: 2025-08-05
Payer: COMMERCIAL

## 2025-08-05 DIAGNOSIS — D22.9 MELANOCYTIC NEVUS, UNSPECIFIED LOCATION: ICD-10-CM

## 2025-08-05 DIAGNOSIS — Z85.820 PERSONAL HISTORY OF MALIGNANT MELANOMA OF SKIN: Primary | ICD-10-CM

## 2025-08-05 DIAGNOSIS — L82.1 SEBORRHEIC KERATOSIS: ICD-10-CM

## 2025-08-05 DIAGNOSIS — L57.9 SKIN CHANGES DUE TO CHRONIC EXPOSURE TO NONIONIZING RADIATION: ICD-10-CM

## 2025-08-05 DIAGNOSIS — Z12.83 SCREENING EXAM FOR SKIN CANCER: ICD-10-CM

## 2025-08-05 DIAGNOSIS — D18.01 HEMANGIOMA OF SKIN: ICD-10-CM

## 2025-08-05 DIAGNOSIS — L81.4 LENTIGO: ICD-10-CM

## 2025-08-05 DIAGNOSIS — L57.0 ACTINIC KERATOSES: ICD-10-CM

## 2025-08-05 PROCEDURE — 17003 DESTRUCT PREMALG LES 2-14: CPT | Performed by: STUDENT IN AN ORGANIZED HEALTH CARE EDUCATION/TRAINING PROGRAM

## 2025-08-05 PROCEDURE — 1159F MED LIST DOCD IN RCRD: CPT | Performed by: STUDENT IN AN ORGANIZED HEALTH CARE EDUCATION/TRAINING PROGRAM

## 2025-08-05 PROCEDURE — 17000 DESTRUCT PREMALG LESION: CPT | Performed by: STUDENT IN AN ORGANIZED HEALTH CARE EDUCATION/TRAINING PROGRAM

## 2025-08-05 PROCEDURE — 99213 OFFICE O/P EST LOW 20 MIN: CPT | Performed by: STUDENT IN AN ORGANIZED HEALTH CARE EDUCATION/TRAINING PROGRAM

## 2025-08-05 NOTE — PROGRESS NOTES
Subjective     Zakiya Barrow is a 70 y.o. female who presents for the following: Skin Check (FBSE. Hx of Melanoma x2 (left posterior lower leg, Left forearm, exc 5/2025, atypical melanocytic neoplasm Right lower leg, exc 6/2025. ).          Review of Systems:  No other skin or systemic complaints other than what is documented elsewhere in the note.    The following portions of the chart were reviewed this encounter and updated as appropriate:         Skin Cancer History  Biopsy Log Book  Biopsied Type Location Status   4/16/25 Melanoma Left Forearm - Posterior Treatment Complete - Excision  5/14/25 4/16/25 Other Benign Neoplasm Right Thigh - Anterior Patient/Caregiver Informed  4/21/25 5/14/25 Melanoma in situ Left Forearm - Posterior Treatment Complete - Excision, clear margins  5/22/25       Additional History      Specialty Problems          Dermatology Problems    Actinic keratosis    Hemangioma of skin and subcutaneous tissue    Keloid    Lichen simplex chronicus    Melanocytic nevi of left upper limb, including shoulder    Melanocytic nevi of scalp and neck    Melanocytic nevi of trunk    Personal history of malignant melanoma of skin    - History of stage T2b melanoma of right calf s/p WLE and indeterminate SLNB 2017, followed as stage IIIA by surgical oncology with lymph node surveillance, cleared for follow up in April 2022      Lesion 1: Nodular Basal Cell Carcinoma.  Deep margins involved.  Year Diagnosed:  2019. November.  Location:  Rt upper chest  Treatment(s): excision 12/2019  Pathology: [id number]     Lesion 2: Superficial Basal Cell Carcinoma.  Deep margins involved.  Year Diagnosed:  2020.  May.  Location:  Medial Upper Chest.  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    A full examination was performed including scalp, head, eyes, ears, nose, lips, neck, chest, axillae, abdomen, back, buttocks, bilateral upper extremities, bilateral lower extremities, hands, feet, fingers, toes, fingernails, and toenails. All findings within normal limits unless otherwise noted below.    Assessment/Plan   Skin Exam  1. PERSONAL HISTORY OF MALIGNANT MELANOMA OF SKIN  Generalized  Well healed scar at site of prior melanoma   -History of stage T2b melanoma of right calf s/p WLE and indeterminate SLNB 2017  - H/o MIS left forearm s/p WLE 2025  - NER  This Visit  - Follow Up In Dermatology - Established Patient  - Follow Up In Dermatology - Established Patient  2. SKIN CHANGES DUE TO CHRONIC EXPOSURE TO NONIONIZING RADIATION  Generalized  Mottled pigmentation, telangiectasias and brown reticular macules in sun exposed areas on the face, extremities, trunk  The risk of chronic, cumulative sun damage and risk of development of skin cancer was reviewed with the patient today. Discussed important of sun protection with sun protective clothing and/or broad spectrum sunscreen spf 30 or above.  Warning signs of skin cancer were reviewed. Patient to contact office should they notice any new or changing pre-existing skin lesion.  3. LENTIGO  Generalized  Scattered tan macules in sun-exposed areas.  Benign nature of these skin lesions reviewed and relation to sun exposure discussed. Reassurance provided. Reviewed warning signs of skin cancer.  4. HEMANGIOMA OF SKIN  Generalized  Bright red papules  Discussed benign nature of condition, reassured. Reviewed warning signs of skin cancer with patient.  5. SEBORRHEIC KERATOSIS  Generalized  Stuck on verrucous, tan-brown papules and plaques.    The benign nature of these skin  lesions were reviewed with the patient today and reassurance was provided. Patient advised to return for f/u if the lesions change in size, shape, color, become painful, tender, itch or bleed.  6. MELANOCYTIC NEVUS, UNSPECIFIED LOCATION  Generalized  Benign to slightly atypical appearing brown pigmented macules and papules  Clinically benign appearing nevi, reassurance provided to the patient today. Discussed important of sun protection with sun protective clothing and/or broad spectrum sunscreen spf 30 or above.  Patient to f/u should they notice any new or changing pre-existing skin lesion.  7. SCREENING EXAM FOR SKIN CANCER  Generalized  8. ACTINIC KERATOSES (9)  Head - Anterior (Face) (3), Left Forearm - Anterior, Right Forearm - Anterior (4), Right Wrist - Anterior  Erythematous gritty macule(s)  Lesions are due to chronic, cumulative sun damage over time and are pre-cancerous. They have a risk of developing into squamous cell carcinoma and therefore treatment recommendations were offered and discussed with the patient. Discussed LN2 & topical chemotherapy creams, risks and benefits of each. The risks and benefits of LN2 were reviewed including incomplete removal, crusting, blister hypo and/or hyperpigmentation, scarring. The patient elected for LN2 today.  - Destr of lesion - Head - Anterior (Face) (3), Left Forearm - Anterior, Right Forearm - Anterior (4), Right Wrist - Anterior  Complexity: simple    Destruction method: cryotherapy    Informed consent: discussed and consent obtained    Lesion destroyed using liquid nitrogen: Yes    Region frozen until ice ball extended beyond lesion: Yes    Cryotherapy cycles:  1  Outcome: patient tolerated procedure well with no complications    Post-procedure details: wound care instructions given

## 2025-08-06 DIAGNOSIS — Z12.11 COLON CANCER SCREENING: ICD-10-CM

## 2025-09-08 ENCOUNTER — APPOINTMENT (OUTPATIENT)
Dept: ENDOCRINOLOGY | Facility: CLINIC | Age: 70
End: 2025-09-08
Payer: COMMERCIAL

## 2025-09-10 ENCOUNTER — APPOINTMENT (OUTPATIENT)
Dept: DERMATOLOGY | Facility: CLINIC | Age: 70
End: 2025-09-10
Payer: COMMERCIAL

## 2025-10-10 ENCOUNTER — APPOINTMENT (OUTPATIENT)
Dept: PRIMARY CARE | Facility: CLINIC | Age: 70
End: 2025-10-10
Payer: COMMERCIAL

## 2025-10-14 ENCOUNTER — APPOINTMENT (OUTPATIENT)
Dept: DERMATOLOGY | Facility: CLINIC | Age: 70
End: 2025-10-14
Payer: COMMERCIAL

## 2025-11-07 ENCOUNTER — APPOINTMENT (OUTPATIENT)
Dept: DERMATOLOGY | Facility: CLINIC | Age: 70
End: 2025-11-07
Payer: COMMERCIAL

## 2025-12-17 ENCOUNTER — APPOINTMENT (OUTPATIENT)
Dept: DERMATOLOGY | Facility: CLINIC | Age: 70
End: 2025-12-17
Payer: COMMERCIAL